# Patient Record
Sex: MALE | Race: BLACK OR AFRICAN AMERICAN | NOT HISPANIC OR LATINO | Employment: UNEMPLOYED | URBAN - METROPOLITAN AREA
[De-identification: names, ages, dates, MRNs, and addresses within clinical notes are randomized per-mention and may not be internally consistent; named-entity substitution may affect disease eponyms.]

---

## 2021-12-02 ENCOUNTER — OFFICE VISIT (OUTPATIENT)
Dept: FAMILY MEDICINE | Facility: CLINIC | Age: 61
End: 2021-12-02
Payer: MEDICARE

## 2021-12-02 VITALS
HEART RATE: 101 BPM | SYSTOLIC BLOOD PRESSURE: 180 MMHG | DIASTOLIC BLOOD PRESSURE: 120 MMHG | OXYGEN SATURATION: 99 % | WEIGHT: 244 LBS

## 2021-12-02 DIAGNOSIS — I10 ESSENTIAL HYPERTENSION: Primary | ICD-10-CM

## 2021-12-02 DIAGNOSIS — Z95.1 S/P CABG (CORONARY ARTERY BYPASS GRAFT): ICD-10-CM

## 2021-12-02 DIAGNOSIS — G62.9 NEUROPATHY: ICD-10-CM

## 2021-12-02 DIAGNOSIS — I48.20 CHRONIC ATRIAL FIBRILLATION (H): ICD-10-CM

## 2021-12-02 LAB
ALBUMIN SERPL-MCNC: 4 G/DL (ref 3.5–5)
ALP SERPL-CCNC: 108 U/L (ref 45–120)
ALT SERPL W P-5'-P-CCNC: 13 U/L (ref 0–45)
ANION GAP SERPL CALCULATED.3IONS-SCNC: 8 MMOL/L (ref 5–18)
AST SERPL W P-5'-P-CCNC: 15 U/L (ref 0–40)
BILIRUB SERPL-MCNC: 1.3 MG/DL (ref 0–1)
BUN SERPL-MCNC: 12 MG/DL (ref 8–22)
CALCIUM SERPL-MCNC: 9.1 MG/DL (ref 8.5–10.5)
CHLORIDE BLD-SCNC: 104 MMOL/L (ref 98–107)
CO2 SERPL-SCNC: 30 MMOL/L (ref 22–31)
CREAT SERPL-MCNC: 0.91 MG/DL (ref 0.7–1.3)
ERYTHROCYTE [DISTWIDTH] IN BLOOD BY AUTOMATED COUNT: 14.4 % (ref 10–15)
GFR SERPL CREATININE-BSD FRML MDRD: >90 ML/MIN/1.73M2
GLUCOSE BLD-MCNC: 94 MG/DL (ref 70–125)
HCT VFR BLD AUTO: 45.4 % (ref 40–53)
HGB BLD-MCNC: 14.6 G/DL (ref 13.3–17.7)
MCH RBC QN AUTO: 25.9 PG (ref 26.5–33)
MCHC RBC AUTO-ENTMCNC: 32.2 G/DL (ref 31.5–36.5)
MCV RBC AUTO: 81 FL (ref 78–100)
PLATELET # BLD AUTO: 193 10E3/UL (ref 150–450)
POTASSIUM BLD-SCNC: 4 MMOL/L (ref 3.5–5)
PROT SERPL-MCNC: 7.3 G/DL (ref 6–8)
RBC # BLD AUTO: 5.63 10E6/UL (ref 4.4–5.9)
SODIUM SERPL-SCNC: 142 MMOL/L (ref 136–145)
WBC # BLD AUTO: 4.7 10E3/UL (ref 4–11)

## 2021-12-02 PROCEDURE — 36415 COLL VENOUS BLD VENIPUNCTURE: CPT | Performed by: NURSE PRACTITIONER

## 2021-12-02 PROCEDURE — 85027 COMPLETE CBC AUTOMATED: CPT | Performed by: NURSE PRACTITIONER

## 2021-12-02 PROCEDURE — 99204 OFFICE O/P NEW MOD 45 MIN: CPT | Mod: 25 | Performed by: NURSE PRACTITIONER

## 2021-12-02 PROCEDURE — 93005 ELECTROCARDIOGRAM TRACING: CPT | Performed by: NURSE PRACTITIONER

## 2021-12-02 PROCEDURE — 93010 ELECTROCARDIOGRAM REPORT: CPT | Mod: OFF | Performed by: INTERNAL MEDICINE

## 2021-12-02 PROCEDURE — 80053 COMPREHEN METABOLIC PANEL: CPT | Performed by: NURSE PRACTITIONER

## 2021-12-02 RX ORDER — AMLODIPINE BESYLATE 10 MG/1
10 TABLET ORAL DAILY
Qty: 90 TABLET | Refills: 1 | Status: SHIPPED | OUTPATIENT
Start: 2021-12-02

## 2021-12-02 RX ORDER — LEVALBUTEROL TARTRATE 45 UG/1
1-2 AEROSOL, METERED ORAL
COMMUNITY
End: 2022-01-19

## 2021-12-02 RX ORDER — CARVEDILOL 25 MG/1
25 TABLET ORAL 2 TIMES DAILY WITH MEALS
Qty: 60 TABLET | Refills: 1 | Status: SHIPPED | OUTPATIENT
Start: 2021-12-02 | End: 2022-02-18

## 2021-12-02 RX ORDER — ATORVASTATIN CALCIUM 80 MG/1
80 TABLET, FILM COATED ORAL DAILY
Qty: 90 TABLET | Refills: 1 | Status: SHIPPED | OUTPATIENT
Start: 2021-12-02

## 2021-12-02 RX ORDER — GABAPENTIN 300 MG/1
300 CAPSULE ORAL 3 TIMES DAILY
Qty: 90 CAPSULE | Refills: 1 | Status: SHIPPED | OUTPATIENT
Start: 2021-12-02

## 2021-12-02 NOTE — PATIENT INSTRUCTIONS
I sent refills of your medication into the [facility] pharmacy in [place].  The address and contact information will be listed on today's paperwork.    We need to get you set up with a new doctor.  My nurse will help you do that today.    I placed a referral to our care coordination team.  Someone will be contacting you to help you with resources such as housing and health insurance.    We need to get a new baseline EKG and lab work today.    You need to establish care with a new cardiologist.  I placed a referral for you today.  Please call [phone].    It is extremely important that you answer your telephone and come to all of your appointments

## 2021-12-02 NOTE — PROGRESS NOTES
Assessment & Plan     Essential hypertension  Uncontrolled but has been off of his amlodipine and carvedilol for the better part of 1 year.  We need to have him restart these medications as soon as possible and come back for BP recheck    I would also like to get a new baseline CMP, CBC, BNP, and EKG    - amLODIPine (NORVASC) 10 MG tablet; Take 1 tablet (10 mg) by mouth daily  - Adult Cardiology Eval Referral; Future  - Comprehensive metabolic panel; Future  - CBC with platelets; Future  - Care Coordination Referral; Future    Neuropathy  Historically on gabapentin 300 mg 3 times daily for neuropathy.  Refills provided today.    - gabapentin (NEURONTIN) 300 MG capsule; Take 1 capsule (300 mg) by mouth 3 times daily  - Care Coordination Referral; Future    S/P CABG (coronary artery bypass graft)  He had some significant health issues over the last couple of years, unfortunately leading to homelessness.  He has been without his medications for the better part of a year.  Currently living with his daughter in [place].    He is not having any chest pain or shortness of breath right now.  I would like to get a new baseline EKG while we restart the atorvastatin and carvedilol.  Prescription sent into his pharmacy.    He needs to establish with a new cardiologist here in the [facility].  He plans on being in this area permanently.    - atorvastatin (LIPITOR) 80 MG tablet; Take 1 tablet (80 mg) by mouth daily  - carvedilol (COREG) 25 MG tablet; Take 1 tablet (25 mg) by mouth 2 times daily (with meals)  - Adult Cardiology Eval Referral; Future  - B-Type Natriuretic Peptide ([facility] Only); Future  - Care Coordination Referral; Future  - EKG 12-lead, tracing only    Chronic atrial fibrillation (H)  He has been without his Eliquis.  No recent strokes although he says that he did have a stroke years ago    - apixaban ANTICOAGULANT (ELIQUIS ANTICOAGULANT) 5 MG tablet; Take 1 tablet (5 mg) by mouth 2 times daily  - Adult  "Cardiology Eval Referral; Future  - B-Type Natriuretic Peptide ([facility] Only); Future  - Care Coordination Referral; Future  - EKG 12-lead, tracing only    Patient Instructions   I sent refills of your medication into the [facility] pharmacy in [place].  The address and contact information will be listed on today's paperwork.    We need to get you set up with a new doctor.  My nurse will help you do that today.    I placed a referral to our care coordination team.  Someone will be contacting you to help you with resources such as housing and health insurance.    We need to get a new baseline EKG and lab work today.    You need to establish care with a new cardiologist.  I placed a referral for you today.  Please call [phone].    It is extremely important that you answer your telephone and come to all of your appointments    Prescription drug management  19 minutes spent on the date of the encounter doing chart review, history and exam, documentation and further activities per the note     See Patient Instructions    Return in about 2 weeks (around 12/16/2021) for Follow up.    [facility]    Marleny Gill is a 61 year old who presents for the following health issues     HPI     Patient has a complicated cardiovascular history including atrial fibrillation, CVA, and CABG.  He was a late add-on to my schedule today for a 20-minute appointment and showed up late for his appointment.  We did not have time to go over his history in detail.    In short, patient is homeless, currently living with his daughter.  He has been without his medications for the better part of a year.    Feeling in good spirits today.  No major concerns today but does want refills.  Like to get established with a new PCP and cardiologist.    I was able to briefly review some records from his cardiology appointments in [place]:      \"Jairo Quevedo is a 59 year old male with cardiac " "history of CAD s/p CABG in 11/6/2018 [LIMA to LAD (extended with sort SVG segment due to short LIMA), SVG to PDA, SVG to Diagonal. OM too small to graft] , persistent atrial fibrillation and cardiac risk factors of CVA (03/24/2015), HTN who is here for follow up. He was also diagnosed with brachial plexitis Dec 2018 and is causing increased pain in his neck and left arm. He reports compliance with his medical therapy. Home BP is reported as 200/100 mmHg. He has no chest pain. He has chronic intermittent MEZA with intermittent bilateral LE edema. He has no loss of consciousness but has some dizziness. He has palpitations that he feels mostly at night.\"    Blood pressure is elevated today but he has not been on his medications.      Review of Systems   Constitutional, HEENT, cardiovascular, pulmonary, gi and gu systems are negative, except as otherwise noted.      Objective    BP (!) 180/120 (BP Location: Left arm, Patient Position: Sitting, Cuff Size: Adult Large)   Pulse 101   Wt 110.7 kg (244 lb)   SpO2 99%   There is no height or weight on file to calculate BMI.  Physical Exam     Patient is otherwise healthy appearing and in no acute distress      "

## 2021-12-03 ENCOUNTER — PATIENT OUTREACH (OUTPATIENT)
Dept: CARE COORDINATION | Facility: CLINIC | Age: 61
End: 2021-12-03
Payer: MEDICARE

## 2021-12-03 NOTE — PROGRESS NOTES
Clinic Care Coordination Contact  [facility]/Voicemail       Clinical Data: Care Coordinator Outreach  Outreach attempted x 1.  Left message on patient's voicemail with call back information and requested return call.  Plan: Care Coordinator will try to reach patient again in 1-2 business days.    Order Specific Question Answer Comment   Reason for Referral: Financial Support     Financial Support: Insurance      Order Specific Question Answer Comment   Reason for Referral: Financial Support     Financial Support: Insurance      Usha Espinosa  Community Health Worker  [facility] Care Coordination     Office: [phone]

## 2021-12-04 LAB
ATRIAL RATE - MUSE: 86 BPM
DIASTOLIC BLOOD PRESSURE - MUSE: NORMAL MMHG
INTERPRETATION ECG - MUSE: NORMAL
P AXIS - MUSE: NORMAL DEGREES
PR INTERVAL - MUSE: NORMAL MS
QRS DURATION - MUSE: 90 MS
QT - MUSE: 370 MS
QTC - MUSE: 455 MS
R AXIS - MUSE: 21 DEGREES
SYSTOLIC BLOOD PRESSURE - MUSE: NORMAL MMHG
T AXIS - MUSE: 68 DEGREES
VENTRICULAR RATE- MUSE: 91 BPM

## 2021-12-06 ENCOUNTER — PATIENT OUTREACH (OUTPATIENT)
Dept: CARE COORDINATION | Facility: CLINIC | Age: 61
End: 2021-12-06

## 2021-12-06 ENCOUNTER — PATIENT OUTREACH (OUTPATIENT)
Dept: NURSING | Facility: CLINIC | Age: 61
End: 2021-12-06
Payer: MEDICARE

## 2021-12-06 NOTE — PROGRESS NOTES
Clinic Care Coordination Contact  [facility]/Voicemail    Clinical Data: Care Coordinator Outreach  Outreach attempted x 2.  Left message on patient's voicemail with call back information and requested return call.  Plan: Care Coordinator sent care coordination introduction letter on 12/8/21 via mail. Care Coordinator will do no further outreaches at this time.    Usha Espinosa  Community Health Worker  [facility] Care Coordination   Office: [phone]

## 2021-12-06 NOTE — LETTER
[facility] CARE COORDINATION  [facility]    December 6, 2021    Jairo Quevedo  [address] [place] [address]    [place] [id number]      Dear Jairo,    I am a clinic community health worker who works with [facility]. I have been trying to reach you recently to introduce Clinic Care Coordination and to see if there was anything I could assist you with.  Below is a description of clinic care coordination and how I can further assist you.      The clinic care coordination team is made up of a registered nurse,  and community health worker who understand the health care system. The goal of clinic care coordination is to help you manage your health and improve access to the health care system in the most efficient manner. The team can assist you in meeting your health care goals by providing education, coordinating services, strengthening the communication among your providers and supporting you with any resource needs.    Please feel free to contact me at [phone] with any questions or concerns. We are focused on providing you with the highest-quality healthcare experience possible and that all starts with you.     Sincerely,     Usha Espinosa  Community Health Worker  [facility] Care Coordination   Office: [phone]

## 2021-12-06 NOTE — PROGRESS NOTES
Clinic Care Coordination Contact  Community Health Worker Initial Outreach    CHW Initial Information Gathering:  Referral Source: PCP  Preferred Hospital: [facility]  [phone]  Current living arrangement:: I live in a private home with family (Living with daughter-temporary)  Type of residence:: Town home  Community Resources: None  Supplies used at home:: None  Equipment Currently Used at Home: none  Informal Support system:: Children  No PCP office visit in Past Year: No (12/2/21 with Dr. Milan)  Transportation means:: Family,Public transportation  CHW Additional Questions  MyChart active?: No    Patient accepts CC: Yes. Patient scheduled for assessment with BETH Palumbo on 12/7/21 at 9:00. Patient noted desire to discuss assistance with looking into Housing options, medication resources, Health Insurance and transportation resources.     ** CHW gave patient medication resources for [employer] $4 program and [employer] Prescription Assistance program.

## 2021-12-07 ENCOUNTER — PATIENT OUTREACH (OUTPATIENT)
Dept: NURSING | Facility: CLINIC | Age: 61
End: 2021-12-07

## 2021-12-07 ENCOUNTER — PATIENT OUTREACH (OUTPATIENT)
Dept: NURSING | Facility: CLINIC | Age: 61
End: 2021-12-07
Payer: MEDICARE

## 2021-12-07 DIAGNOSIS — Z95.1 S/P CABG (CORONARY ARTERY BYPASS GRAFT): ICD-10-CM

## 2021-12-07 DIAGNOSIS — G62.9 NEUROPATHY: ICD-10-CM

## 2021-12-07 DIAGNOSIS — I48.20 CHRONIC ATRIAL FIBRILLATION (H): ICD-10-CM

## 2021-12-07 DIAGNOSIS — I10 ESSENTIAL HYPERTENSION: ICD-10-CM

## 2021-12-07 ASSESSMENT — ACTIVITIES OF DAILY LIVING (ADL): DEPENDENT_IADLS:: INDEPENDENT

## 2021-12-07 NOTE — PROGRESS NOTES
Clinic Care Coordination Contact    Community Health Worker Follow Up    Intervention and Education during outreach: Patient called CHW to confirm prescription resource given to patient by [facility]. CHW and patient discussed [place] Prescription Assistance program and gave patient that information again.     CHW Plan: CHW will consult with CCC SW to see if there were any other RX resources given or that we have for the patient and call him back if so. Next CHW outreach date is 1/7/21.    Usha Espinosa  [facility] Health Worker  [facility]  Clinic Care Coordination   Office: [phone]

## 2021-12-07 NOTE — PROGRESS NOTES
Clinic Care Coordination Contact    Clinic Care Coordination Contact  OUTREACH    Referral Information:  Referral Source: PCP    Primary Diagnosis: Cardiovascular - other    Chief Complaint   Patient presents with     Clinic Care Coordination - Initial        Universal Utilization:   Clinic Utilization  Difficulty keeping appointments:: No  Compliance Concerns: Yes  No-Show Concerns: Yes  No PCP office visit in Past Year: No (12/2/21 with Dr. Milan)  Utilization    Hospital Admissions  0             ED Visits  0             No Show Count (past year)  0                Current as of: 12/7/2021 12:29 PM              Clinical Concerns:  Current Medical Concerns:  A fib, hypertension,  S/P CABG (coronary artery bypass graft)    Current Behavioral Concerns: not discussed      Education Provided to patient:     *Provided overview/education on Medical Assistance and process for applying for Medical Assistance, SNAP, Medicare Savings and Cash assistance    *Mailed housing resources     [facility]  [phone]     [facility]  [phone]    [facility]  [phone]    [facility]  [phone]    [facility]  [phone]    *Provided [place] Prescription Assistance number and advised to call     Health Maintenance Reviewed:  (Overdue; Establishing with PCP 12/22)    Medication Management:  Medication review status: Not discussed due to nature of call    Functional Status:  Dependent ADLs:: Independent  Dependent IADLs:: Independent  Mobility Status: Independent  Fallen 2 or more times in the past year?: No  Any fall with injury in the past year?: No    Living Situation:  Current living arrangement:: I live in a private home with family (Living with daughter-temporary)  Type of residence:: [facility]    Lifestyle & Psychosocial Needs:    Social Determinants of Health     Tobacco Use: Low Risk      Smoking Tobacco Use: Never Smoker     Smokeless Tobacco Use: Never Used   Alcohol Use:  Not on file   Financial Resource Strain: Not on file   Food Insecurity: Not on file   Transportation Needs: Not on file   Physical Activity: Not on file   Stress: Not on file   Social Connections: Not on file   Intimate Partner Violence: Not on file   Depression: Not on file   Housing Stability: Not on file     Inadequate nutrition (GOAL):: No  Tube Feeding: No  Inadequate activity/exercise (GOAL):: No  Significant changes in sleep pattern (GOAL): No  Transportation means:: Family,Public transportation     Baptism or spiritual beliefs that impact treatment:: No  Mental health DX:: No  Mental health management concern (GOAL):: No  Chemical Dependency Status: No Current Concerns  Informal Support system:: Children      Resources and Interventions:  Current Resources:      Community Resources: None  Supplies used at home:: None  Equipment Currently Used at Home: none  Employment Status: disabled ([employer] $640/month)     Advance Care Plan/Directive  Advanced Care Plans/Directives on file:: No  Type Advanced Care Plans/Directives: Other  Advanced Care Plan/Directive Status: Declined Further Information    Referrals Placed: [facility] Resources,Transportation,Community Resources,Prescription Assistance Programs     Goals:   Goals        General     1- Medication (pt-stated)      Notes - Note created  12/7/2021  1:32 PM by Deepali Daniels LSW     Goal Statement: I will fill my prescriptions    Date Goal set: 12/7/21  Barriers: not taking medications  Strengths: willing to seek assistance   Date to Achieve By: 1 week  Patient expressed understanding of goal: yes    Action steps to achieve this goal:  1. I will contact [place] Prescription Assistance and provide necessary documentation to qualify for the program  2. I will attempt to fill my prescriptions at [facility] for those on their low cost list         2-Financial Wellbeing (pt-stated)      Notes - Note created  12/7/2021  1:28 PM by Deepali Daniels LSW     Goal Statement:  I will apply for health insurance, SNAP, Medicare Savings and Cash Assistance within the next 1-2 weeks if I am eligible.     Date Goal Set:  12/7/21  Strengths:  willing to seek assistance  Barriers: no insurance, low income  Date to Achieve By: 1-2 months  Patient expressed understanding of goal: yes    Action steps to achieve this goal:  1. I understand a referral was placed to the Financial Resource Worker, I will receive a call within the next 3 business days.    2. I understand the financial worker will make two attempts to call me. If I still need help with this goal, I will connect with my Community Health Worker Usha at [phone].              3- Functional (pt-stated)      Notes - Note created  12/7/2021  1:27 PM by Deepali Daniels LSW     Goal Statement: I will be approved for [employer] transportation    Date Goal set: 12/7/21  Barriers: no PCP  Strengths: willingess  Date to Achieve By: 2 alireza  Patient expressed understanding of goal: yes    Action steps to achieve this goal:  1. I will ask PCP on 12/22/21 to complete [employer] form  2. I will complete and mail my portion of the application         4- Psychosocial (pt-stated)      Notes - Note created  12/7/2021  1:34 PM by Deepali Daniels LSW     Goal Statement: I will get on wait lists for subsidized housing    Date Goal set: 12/7/21  Barriers: temporary housing  Strengths: daughter  Date to Achieve By: 6 alireza  Patient expressed understanding of goal: yes    Action steps to achieve this goal:  1. I will contact subsidized housing apartments and provide information needed to get on wait lists                   Financial Resource Worker Screening    County Benefits  Is patient requesting help applying for county benefits?: Yes  Have you recently applied for any county benefits?: No  How many people in your household?: 2  Do you buy/eat food together?: No  What is the monthly gross income for the household (wages, social security,  workers comp, and pension)? : 640    Insurance:  Was [place] for active insurance?: No  Is this an insurance renewal?: No  Is this a new insurance application request?: Yes  Have you recently applied for insurance?: No  How many people in your household? : 2  Do you file taxes?: No    Any other information for the FRW?: This gentleman recently moved from [place] and staying with daughter. Please assist with applications for [place], Medicare Savings Program, SNAP and cash assistance (if he qualifies) Hx stroke/bypass so processes information slowly (ex: writing down numbers etc)    Patient/Caregiver understanding: Pt agreeable to follow up from FRW and CC team    Outreach Frequency: monthly  Future Appointments              In 2 weeks Piedad Wade CNP [facility] WBWW          Plan: CHW outreach monthly.  CC SW available as needed and will monitor chart every 45 days.

## 2021-12-07 NOTE — LETTER
[facility] CARE COORDINATION  [facility]  [address] [place] [id number]    December 7, 2021    Jairo Quevedo  [address] [place] [address] [address] [place]  [place] [address]      Dear Jairo,    I am a clinic care coordinator who works with [facility]. I wanted to thank you for spending the time to talk with me.  Below are some housing resources.    [facility]  [phone]     [facility]  [phone]    [facility]  [phone]    [facility]  [phone]    [facility]  [phone]      Please feel free to contact the Community Health Worker at [phone] with any questions or concerns. We are focused on providing you with the highest-quality healthcare experience possible and that all starts with you.     Sincerely,     Care Coordination Team    Enclosed: I have enclosed a copy of the Patient Centered Plan of Care. This has helpful information and goals that we have talked about. Please keep this in an easy to access place to use as needed.

## 2021-12-07 NOTE — LETTER
[facility]  Patient Centered Plan of Care  About Me:        Patient Name:  Jairo Quevedo    YOB: 1960  Age:         61 year old   [facility] MRN:    [id number] Telephone Information:  Home Phone [phone]   Mobile [phone]       Address:  [address] [place]  [place] [address] Email address:  [email or web address]      Emergency Contact(s)    Name Relationship Lgl Grd Work Phone Home Phone Mobile Phone   SOPHIA EDWARDS Daughter   [phone]              Health Maintenance  Health Maintenance Reviewed: No data recorded    My Access Plan  Medical Emergency 911   Primary Clinic Line   - [phone]   24 Hour Appointment Line [phone] or  [phone]) (toll-free)   24 Hour Nurse Line [phone] (toll-free)   Preferred Urgent Care [facility] [phone]     [facility]  [phone]     Preferred Pharmacy [id number] - [place], [place] - [address] Behavioral Health Crisis Line The National Suicide Prevention Lifeline at 1-962.925.8076 or 913             My Care Team Members  Patient Care Team       Relationship Specialty Notifications Start End    No Ref-Primary, Physician PCP - General   12/2/21 12/22/21    Fax: [phone]         Deepali Daniels LSW Lead Care Coordinator  Admissions 12/7/21     Usha Espinosa Community Health Worker  Admissions 12/7/21     Rina Byers RN Clinic Care Coordinator Primary Care - CC Admissions 12/7/21     Phone: [phone]                 My Care Plans  Self Management and Treatment Plan  Goals and (Comments)  Goals        General     1- Medication (pt-stated)      Notes - Note created  12/7/2021  1:32 PM by Deepali Daniels LSW     Goal Statement: I will fill my prescriptions    Date Goal set: 12/7/21  Barriers: not taking medications  Strengths: willing to seek assistance   Date to Achieve By: 1 week  Patient expressed  understanding of goal: yes    Action steps to achieve this goal:  1. I will contact [place] Prescription Assistance and provide necessary documentation to qualify for the program  2. I will attempt to fill my prescriptions at [facility] for those on their low cost list         2-Financial Wellbeing (pt-stated)      Notes - Note created  12/7/2021  1:28 PM by Deepali Daniels LSW     Goal Statement: I will apply for health insurance, SNAP, Medicare Savings and Cash Assistance within the next 1-2 weeks if I am eligible.     Date Goal Set:  12/7/21  Strengths:  willing to seek assistance  Barriers: no insurance, low income  Date to Achieve By: 1-2 months  Patient expressed understanding of goal: yes    Action steps to achieve this goal:  1. I understand a referral was placed to the Financial Resource Worker, I will receive a call within the next 3 business days.    2. I understand the financial worker will make two attempts to call me. If I still need help with this goal, I will connect with my Community Health Worker Usha at [phone].              3- Medical (pt-stated)      Notes - Note created  12/7/2021  2:13 PM by Deepali Daniels LSW     Goal Statement: I will establish care with cardiology once I have health insurance     Date Goal set: 12/7/21  Barriers: no cardiologist  Strengths: order placed  Date to Achieve By: 3 months  Patient expressed understanding of goal: yes  Action steps to achieve this goal:  1. I will schedule and attend appointment  2. I will attend follow up appointment          4- Functional (pt-stated)      Notes - Note created  12/7/2021  1:27 PM by Deepali Daniels LSW     Goal Statement: I will be approved for [employer] transportation    Date Goal set: 12/7/21  Barriers: no PCP  Strengths: willingess  Date to Achieve By: 2 [facility]  Patient expressed understanding of goal: yes    Action steps to achieve this goal:  1. I will ask PCP on 12/22/21 to complete [employer] form  2. I  will complete and mail my portion of the application         5- Psychosocial (pt-stated)      Notes - Note created  12/7/2021  1:34 PM by Deepali Daniels LSW     Goal Statement: I will get on wait lists for subsidized housing    Date Goal set: 12/7/21  Barriers: temporary housing  Strengths: daughter  Date to Achieve By: 6 [facility]  Patient expressed understanding of goal: yes    Action steps to achieve this goal:  1. I will contact subsidized housing apartments and provide information needed to get on wait lists                     Advance Care Plans/Directives Type:   No data recorded    My Medical and Care Information  Problem List   There is no problem list on file for this patient.     Current Medications and Allergies:  See printed Medication Report.    Care Coordination Start Date: 12/7/2021   Frequency of Care Coordination: monthly     Form Last Updated: 12/07/2021

## 2021-12-08 ENCOUNTER — PATIENT OUTREACH (OUTPATIENT)
Dept: CARE COORDINATION | Facility: CLINIC | Age: 61
End: 2021-12-08
Payer: MEDICARE

## 2021-12-08 NOTE — PROGRESS NOTES
Clinic Care Coordination Contact  Financial Resource Worker Follow Up    Goals:   Goals Addressed as of 12/17/2021 at 10:20 AM                    12/8/21       2-Financial Wellbeing (pt-stated)   50%    Added 12/7/21 by Deepali Daniels LSW      Goal Statement: I will apply for health insurance, SNAP, Medicare Savings and Cash Assistance within the next 1-2 weeks if I am eligible.     Date Goal Set:  12/7/21  Strengths:  willing to seek assistance  Barriers: no insurance, low income  Date to Achieve By: 1-2 months  Patient expressed understanding of goal: yes    Action steps to achieve this goal:  1. I understand a referral was placed to the Financial Resource Worker, I will receive a call within the next 3 business days.    2. I understand the financial worker will make two attempts to call me. If I still need help with this goal, I will connect with my Community Health Worker Usha at [phone].    3. I will provide any necessary documents to the county if needed.                  Intervention and Education during outreach: N/A     FRW Plan: follow up in 3 to 4 weeks

## 2021-12-13 ENCOUNTER — PATIENT OUTREACH (OUTPATIENT)
Dept: NURSING | Facility: CLINIC | Age: 61
End: 2021-12-13
Payer: MEDICARE

## 2021-12-13 NOTE — PROGRESS NOTES
Clinic Care Coordination Contact    Community Health Worker Follow Up    Care Gaps:     Health Maintenance Due   Topic Date Due     ADVANCE CARE PLANNING  Never done     COLORECTAL CANCER SCREENING  Never done     COVID-19 Vaccine (1) Never done     HIV SCREENING  Never done     MEDICARE ANNUAL WELLNESS VISIT  Never done     HEPATITIS C SCREENING  Never done     DTAP/TDAP/TD IMMUNIZATION (1 - Tdap) Never done     LIPID  Never done     ZOSTER IMMUNIZATION (1 of 2) Never done     PHQ-2  Never done     INFLUENZA VACCINE (1) Never done       Postponed to next CHW outreach date     Goals:       Intervention and Education during outreach: patient called to return FRW call. CHW messaged FRW [place]. FRW will call patient back today 12/13/21 after the lunch hour.     CHW Plan: CHW will follow up with patient 1/7/22.

## 2021-12-17 ENCOUNTER — APPOINTMENT (OUTPATIENT)
Dept: CARE COORDINATION | Facility: CLINIC | Age: 61
End: 2021-12-17
Payer: MEDICARE

## 2021-12-22 ENCOUNTER — OFFICE VISIT (OUTPATIENT)
Dept: INTERNAL MEDICINE | Facility: CLINIC | Age: 61
End: 2021-12-22
Payer: MEDICARE

## 2021-12-22 VITALS
HEIGHT: 72 IN | DIASTOLIC BLOOD PRESSURE: 82 MMHG | SYSTOLIC BLOOD PRESSURE: 148 MMHG | HEART RATE: 88 BPM | WEIGHT: 242 LBS | BODY MASS INDEX: 32.78 KG/M2

## 2021-12-22 DIAGNOSIS — E66.811 CLASS 1 OBESITY DUE TO EXCESS CALORIES WITH SERIOUS COMORBIDITY AND BODY MASS INDEX (BMI) OF 32.0 TO 32.9 IN ADULT: ICD-10-CM

## 2021-12-22 DIAGNOSIS — R10.31 RLQ ABDOMINAL PAIN: ICD-10-CM

## 2021-12-22 DIAGNOSIS — Z76.89 ENCOUNTER TO ESTABLISH CARE: ICD-10-CM

## 2021-12-22 DIAGNOSIS — E66.09 CLASS 1 OBESITY DUE TO EXCESS CALORIES WITH SERIOUS COMORBIDITY AND BODY MASS INDEX (BMI) OF 32.0 TO 32.9 IN ADULT: ICD-10-CM

## 2021-12-22 DIAGNOSIS — G62.9 NEUROPATHY: ICD-10-CM

## 2021-12-22 DIAGNOSIS — I10 BENIGN ESSENTIAL HYPERTENSION: ICD-10-CM

## 2021-12-22 DIAGNOSIS — I48.20 CHRONIC ATRIAL FIBRILLATION (H): ICD-10-CM

## 2021-12-22 DIAGNOSIS — R10.9 RIGHT FLANK PAIN: ICD-10-CM

## 2021-12-22 DIAGNOSIS — M54.9 UPPER BACK PAIN ON RIGHT SIDE: ICD-10-CM

## 2021-12-22 DIAGNOSIS — E78.5 HYPERLIPIDEMIA LDL GOAL <130: ICD-10-CM

## 2021-12-22 LAB
ALBUMIN SERPL-MCNC: 3.8 G/DL (ref 3.5–5)
ALBUMIN UR-MCNC: 30 MG/DL
ALP SERPL-CCNC: 126 U/L (ref 45–120)
ALT SERPL W P-5'-P-CCNC: 12 U/L (ref 0–45)
ANION GAP SERPL CALCULATED.3IONS-SCNC: 9 MMOL/L (ref 5–18)
APPEARANCE UR: CLEAR
AST SERPL W P-5'-P-CCNC: 11 U/L (ref 0–40)
BACTERIA #/AREA URNS HPF: ABNORMAL /HPF
BASOPHILS # BLD AUTO: 0 10E3/UL (ref 0–0.2)
BASOPHILS NFR BLD AUTO: 0 %
BILIRUB SERPL-MCNC: 1.2 MG/DL (ref 0–1)
BILIRUB UR QL STRIP: ABNORMAL
BUN SERPL-MCNC: 14 MG/DL (ref 8–22)
CALCIUM SERPL-MCNC: 9.2 MG/DL (ref 8.5–10.5)
CHLORIDE BLD-SCNC: 108 MMOL/L (ref 98–107)
CO2 SERPL-SCNC: 26 MMOL/L (ref 22–31)
COLOR UR AUTO: YELLOW
CREAT SERPL-MCNC: 0.85 MG/DL (ref 0.7–1.3)
EOSINOPHIL # BLD AUTO: 0.1 10E3/UL (ref 0–0.7)
EOSINOPHIL NFR BLD AUTO: 2 %
ERYTHROCYTE [DISTWIDTH] IN BLOOD BY AUTOMATED COUNT: 14.2 % (ref 10–15)
GFR SERPL CREATININE-BSD FRML MDRD: >90 ML/MIN/1.73M2
GLUCOSE BLD-MCNC: 89 MG/DL (ref 70–125)
GLUCOSE UR STRIP-MCNC: NEGATIVE MG/DL
HCT VFR BLD AUTO: 42.7 % (ref 40–53)
HGB BLD-MCNC: 13.8 G/DL (ref 13.3–17.7)
HGB UR QL STRIP: ABNORMAL
IMM GRANULOCYTES # BLD: 0 10E3/UL
IMM GRANULOCYTES NFR BLD: 0 %
KETONES UR STRIP-MCNC: ABNORMAL MG/DL
LEUKOCYTE ESTERASE UR QL STRIP: NEGATIVE
LYMPHOCYTES # BLD AUTO: 1.5 10E3/UL (ref 0.8–5.3)
LYMPHOCYTES NFR BLD AUTO: 31 %
MCH RBC QN AUTO: 26.3 PG (ref 26.5–33)
MCHC RBC AUTO-ENTMCNC: 32.3 G/DL (ref 31.5–36.5)
MCV RBC AUTO: 81 FL (ref 78–100)
MONOCYTES # BLD AUTO: 0.3 10E3/UL (ref 0–1.3)
MONOCYTES NFR BLD AUTO: 7 %
MUCOUS THREADS #/AREA URNS LPF: PRESENT /LPF
NEUTROPHILS # BLD AUTO: 2.9 10E3/UL (ref 1.6–8.3)
NEUTROPHILS NFR BLD AUTO: 61 %
NITRATE UR QL: NEGATIVE
PH UR STRIP: 5.5 [PH] (ref 5–8)
PLATELET # BLD AUTO: 196 10E3/UL (ref 150–450)
POTASSIUM BLD-SCNC: 4.2 MMOL/L (ref 3.5–5)
PROT SERPL-MCNC: 7.2 G/DL (ref 6–8)
RBC # BLD AUTO: 5.25 10E6/UL (ref 4.4–5.9)
RBC #/AREA URNS AUTO: ABNORMAL /HPF
SODIUM SERPL-SCNC: 143 MMOL/L (ref 136–145)
SP GR UR STRIP: >=1.03 (ref 1–1.03)
SQUAMOUS #/AREA URNS AUTO: ABNORMAL /LPF
UROBILINOGEN UR STRIP-ACNC: 1 E.U./DL
WBC # BLD AUTO: 4.8 10E3/UL (ref 4–11)
WBC #/AREA URNS AUTO: ABNORMAL /HPF

## 2021-12-22 PROCEDURE — 85025 COMPLETE CBC W/AUTO DIFF WBC: CPT | Performed by: NURSE PRACTITIONER

## 2021-12-22 PROCEDURE — 80053 COMPREHEN METABOLIC PANEL: CPT | Performed by: NURSE PRACTITIONER

## 2021-12-22 PROCEDURE — 36415 COLL VENOUS BLD VENIPUNCTURE: CPT | Performed by: NURSE PRACTITIONER

## 2021-12-22 PROCEDURE — 99214 OFFICE O/P EST MOD 30 MIN: CPT | Performed by: NURSE PRACTITIONER

## 2021-12-22 PROCEDURE — 81001 URINALYSIS AUTO W/SCOPE: CPT | Performed by: NURSE PRACTITIONER

## 2021-12-22 RX ORDER — METHOCARBAMOL 500 MG/1
500 TABLET, FILM COATED ORAL 3 TIMES DAILY
Qty: 21 TABLET | Refills: 0 | Status: SHIPPED | OUTPATIENT
Start: 2021-12-22 | End: 2021-12-28

## 2021-12-22 RX ORDER — TRAMADOL HYDROCHLORIDE 50 MG/1
50 TABLET ORAL EVERY 6 HOURS PRN
Qty: 10 TABLET | Refills: 0 | Status: SHIPPED | OUTPATIENT
Start: 2021-12-22 | End: 2021-12-25

## 2021-12-22 RX ORDER — WARFARIN SODIUM 5 MG/1
5 TABLET ORAL DAILY
Qty: 3 TABLET | Refills: 0 | Status: SHIPPED | OUTPATIENT
Start: 2021-12-22 | End: 2021-12-23

## 2021-12-22 ASSESSMENT — MIFFLIN-ST. JEOR: SCORE: 1945.7
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
## 2021-12-22 NOTE — PATIENT INSTRUCTIONS
Rest, push fluids.    Your urine and white count were normal.    Try Robaxin as needed to help loosen your muscles up.     Start the warfarin on Monday.     Call [phone] to set up your CT scan.     Call [phone] to set up your Cardiology referral.    ER over the rest of the week if having worsening pain, nausea, vomiting, fever, or chills.     Patient Education     Using Blood Thinners (Anticoagulants)  Blood thinners are medicines that help prevent blood clots from forming. They are also called anticoagulants. The medicines include:     Warfarin    Heparin    Dabigatran    Rivaroxaban    Apixaban    Edoxaban    Betrixaban  Your healthcare provider will talk with you about which medicine is best for you.     Before you start taking a blood thinner  Before starting your medicine, tell your healthcare provider if you have any of these:     Stomach ulcer, now or in the past    Vomiting of blood    Blood in your stool (black or red color)    Aneurysm    Pericarditis    Pericardial effusion    Blood disorder    Recent surgery    Stroke or ministroke (called a TIA)    Spinal puncture    Kidney or liver disease    Uncontrolled high blood pressure    Diabetes    Vasculitis  Also tell your healthcare provider if any of these apply to you:     You are younger than 18 years old.    You had a recent or have a planned dental procedure.    You are pregnant or breastfeeding.  This list may not include all conditions that can affect how your medicine works. Talk with your healthcare provider and pharmacist.   Medicines that interact with blood thinners  Many medicines change how blood thinners work. Before taking a blood thinner, tell your healthcare provider about all medicines and supplements you take. This includes prescription and over-the-counter medicines, vitamins, or herbs. Tell your healthcare provider if you take:     Antibiotic medicine    Heart medicine    Cimetidine    Anti-inflammatory medicine such as  aspirin, ibuprofen, naproxen, ketoprofen, or arthritis medicine    Medicine for depression, cancer, HIV (protease inhibitors), diabetes, seizures, gout, high cholesterol, or thyroid    Multi-vitamins that have vitamin K    Herbs such as ginkgo, Co-Q10, garlic, or John's wort  This list may not include all medicines and supplements that can affect how your medicine works. Talk with your healthcare provider and pharmacist.   Taking a blood thinner safely  When you are taking a blood thinner, you will need to take care to stay safe. Too much blood thinner puts you at risk for bleeding. Too little puts you at risk for stroke. Follow these guidelines. Also follow any others that your healthcare provider gives you.     Have regular blood tests as advised. Warfarin requires regular testing at least once a month. The other medicines do not.    Tell your healthcare provider about all medicines you take. This includes over-the-counter medicines, vitamins, or herbal remedies. Don't take any medicines that your healthcare provider doesn t know about. This includes ones you buy over-the-counter. Some medicines can affect how blood thinners work. This can cause serious problems.    Tell all of your healthcare providers that you take a blood thinner. This includes your dentist, chiropractor, home health nurse, and other doctors.    Carry a medical ID card or wear a medical-alert bracelet that says you take a blood thinner.    Before taking aspirin, check with your healthcare provider. Aspirin can greatly increase your risk of bleeding.  Blood thinners make bleeding harder to stop. Even a small injury could cause a lot of bleeding. An injury can cause bleeding inside your body that you don t know about. To protect yourself:     Don't do any activities that may cause injury.    Use a soft-bristle toothbrush and waxed dental floss.    Shave with an electric razor rather than a blade.    Don t go barefoot.    Don t trim corns or  calluses yourself.  Important safety information  Be extra careful when you are taking a blood thinner. Always:     Take it exactly as prescribed. Follow your healthcare provider's instructions. Blood thinners can be dangerous if not taken correctly. They make your blood less likely to form clots. If you take too much, it can cause serious internal or external bleeding.    Take it at the same time each day. Take it with a full glass of water, with or without food. If you miss a dose, call your healthcare provider to find out how much to take. Don't take a double dose.    Have regular blood tests. You may need to have regular tests while you are taking blood thinners. These may include blood tests to check your international normalized ratio (INR) and prothrombin time (PT). These tests show how fast your blood clots. You will also have a complete blood count (CBC) once in a while. This looks at your blood and platelet levels. Both of these need to be watched while you're on warfarin. Ask your healthcare provider if you need to visit the clinic every week. You can also find out if your blood can be tested at home. Ask your provider how often you will need your blood tested for the blood thinner you are taking.    Tell your healthcare provider about changes in your medicines.  Some medicines can affect your INR and PT levels. This includes any over-the-counter medicines, supplements like vitamin K, or herbal remedies.    Keep your diet the same. What you eat can also affect your INR and PT levels. So eat a consistent diet. It's most important to eat the same amount of foods that are high in vitamin K. Talk with your healthcare provider before making any changes in your diet.    Be careful not to injure yourself. Remember that warfarin increases your risk of bleeding. If you have a severe injury and are concerned about internal or external bleeding, call your provider.  Blood thinners: Getting regular blood tests   You  will have 2 tests to find out how your blood is clotting. One is called prothrombin time (PT). The other is called the international normalized ratio (INR).     Get your blood tests as often as directed.    Follow up with your healthcare provider as advised. It may take a few hours for him or her to get your results. Call to find out your lab results. Ask your healthcare provider if you need to make changes to your dose or diet.    If your blood tests are not done at your healthcare provider s office, tell him or her as soon as you get your test results.  My next INR/PT blood test is on _____________ (date) at ___________ (time) by ___________ (name of doctor or clinic).   The name of the healthcare provider who is in charge of my anticoagulation therapy is _____________________. The phone number is _________________.   Watching what you eat  Vitamin K is a nutrient found in some foods. Vitamin K helps your blood clot. Because of this, you may need to watch how much you eat of foods that have vitamin K. These foods can affect the way your blood thinner works.     Try to keep your diet about the same each day. If you change your diet for any reason, such as for illness or to lose weight, tell your healthcare provider.    Each day, eat the same amount of foods that are high in vitamin K. These include asparagus, avocado, broccoli, cabbage, kale, spinach, and some other leafy green vegetables.    Limit oils that are high in vitamin K to 2 to 4 tablespoons a day. This includes oils such as soybean, canola, and olive    Ask your healthcare provider if you should not drink alcohol while you are taking a blood thinner.    Don't drink teas that contain sweet clover, sweet iain, or tonka beans.    Talk with your healthcare provider and pharmacist about foods or ingredients that can affect blood thinner levels. These include grapefruit juice, cranberries and cranberry juice, fish oil, garlic, abad, licorice, turmeric,  and herbal teas and supplements.  This list may not include all foods and ingredients that can affect how your medicine works. Talk with your healthcare provider and pharmacist.   When to call your healthcare provider  Call your healthcare provider right away if you have any of these:     Bleeding that doesn t stop in 10 minutes    A heavy menstrual period or bleeding between periods    Coughing up or vomiting blood    Bloody diarrhea    Bleeding hemorrhoids     Dark-colored urine    Black stools    Red or black-and-blue marks on the skin that get larger    Dizziness    Chest pain    Trouble breathing    Rash    Itching    Swelling    Trouble swallowing  [employer] last reviewed this educational content on 1/1/2020 2000-2021 [employer]. All rights reserved. This information is not intended as a substitute for professional medical care. Always follow your healthcare professional's instructions.           Patient Education     Flank Pain with Uncertain Cause    The flank is the area between your upper belly (abdomen) and your back. Pain there is often caused by a problem with your kidneys. It might be a kidney infection or a kidney stone. Other causes of flank pain include spinal arthritis, a pinched nerve from a back injury, a rib injury, a bruise, a back muscle strain, inflammation, or spasm.  The cause of your flank pain is not certain. You may need other tests.  Home care  Follow these tips when caring for yourself at home:    You may use acetaminophen or ibuprofen to control pain, unless your healthcare provider prescribed another medicine. If you have chronic liver or kidney disease, talk with your provider before taking these medicines. Also talk with your provider first if you ve ever had a stomach ulcer or digestive bleeding.    If the pain is coming from your muscles, you may get relief with ice or heat. During the first 2 days after the injury, put an ice pack on the painful area for 20  minutes every 2 to 4 hours. This will reduce swelling and pain. A hot shower, hot bath, or heating pad works well for a muscle spasm. You can start with ice, then switch to heat after 2 days. You might find that alternating ice and heat works well. Use the method that feels the best to you.  Follow-up care  Follow up with your healthcare provider if your symptoms don t get better over the next few days.  When to seek medical advice  Call your healthcare provider right away if any of these happen:    Repeated vomiting    Fever of 100.4 F (38 C) or higher, or as directed by your healthcare provider    Flank pain that gets worse    Pain that spreads to the front of your belly (abdomen)    Dizziness, weakness, or fainting    Blood in your urine    Burning feeling when you urinate or the need to urinate often    Pain in one of your legs that gets worse    Numbness or weakness in a leg  [employer] last reviewed this educational content on 10/1/2019    4296-3907 [employer]. All rights reserved. This information is not intended as a substitute for professional medical care. Always follow your healthcare professional's instructions.

## 2021-12-23 ENCOUNTER — TELEPHONE (OUTPATIENT)
Dept: INTERNAL MEDICINE | Facility: CLINIC | Age: 61
End: 2021-12-23
Payer: MEDICARE

## 2021-12-23 ENCOUNTER — TELEPHONE (OUTPATIENT)
Dept: ANTICOAGULATION | Facility: CLINIC | Age: 61
End: 2021-12-23
Payer: MEDICARE

## 2021-12-23 DIAGNOSIS — I48.20 CHRONIC ATRIAL FIBRILLATION (H): ICD-10-CM

## 2021-12-23 DIAGNOSIS — I48.20 CHRONIC ATRIAL FIBRILLATION (H): Primary | ICD-10-CM

## 2021-12-23 DIAGNOSIS — Z86.73 HISTORY OF STROKE: ICD-10-CM

## 2021-12-23 RX ORDER — WARFARIN SODIUM 5 MG/1
5 TABLET ORAL DAILY
Qty: 30 TABLET | Refills: 1 | Status: SHIPPED | OUTPATIENT
Start: 2021-12-23 | End: 2022-01-19

## 2021-12-23 NOTE — TELEPHONE ENCOUNTER
Please call him and tell him I transmitted prescription for warfarin 5 mg tablets, #30.  Tell him that the anticoagulation clinic nurses will be contacting him probably in the next couple days, and they will be ordering his INR blood tests and adjusting his dose of warfarin, which may involve new prescriptions for different size tablets.

## 2021-12-23 NOTE — TELEPHONE ENCOUNTER
Reason for Call:  Medication or medication refill:    Do you use a [facility]? No. Name of the pharmacy and phone number for the current request:  [facility] [address] [place] [address]  [phone]    Name of the medication requested: warfarin ANTICOAGULANT (COUMADIN) 5 MG tablet     Other request: Pt only got 3 tablets.... he is wondering why this is so? This is medication for his heart, please advise and send more...     Can we leave a detailed message on this number? YES    Phone number patient can be reached at: Home number on file [phone] (home)    Best Time: any    Call taken on 12/23/2021 at 1:52 PM by Nicholas Medina

## 2021-12-23 NOTE — TELEPHONE ENCOUNTER
Spoke with Jairo and introduced self and ACM program. He will be starting warfarin on Monday 12/27 as instructed at the office visit. He did get the warfarin 5 mg and understands to take 1 tablet daily in the evening. Discussed INR monitoring. INR appointment made for 12/30.  Discussed diet, alcohol and medication interactions. He has ACN number to call with any questions. New patient packet sent in mail.

## 2021-12-24 ENCOUNTER — HOSPITAL ENCOUNTER (OUTPATIENT)
Dept: CT IMAGING | Facility: CLINIC | Age: 61
Discharge: HOME | End: 2021-12-24
Attending: NURSE PRACTITIONER | Admitting: NURSE PRACTITIONER
Payer: MEDICARE

## 2021-12-24 DIAGNOSIS — R10.31 RLQ ABDOMINAL PAIN: ICD-10-CM

## 2021-12-24 DIAGNOSIS — R10.9 RIGHT FLANK PAIN: ICD-10-CM

## 2021-12-24 PROCEDURE — 74176 CT ABD & PELVIS W/O CONTRAST: CPT | Mod: MG

## 2021-12-28 DIAGNOSIS — R30.0 DYSURIA: Primary | ICD-10-CM

## 2021-12-28 DIAGNOSIS — M54.9 UPPER BACK PAIN ON RIGHT SIDE: ICD-10-CM

## 2021-12-28 DIAGNOSIS — R10.9 RIGHT FLANK PAIN: ICD-10-CM

## 2021-12-28 RX ORDER — METHOCARBAMOL 500 MG/1
500 TABLET, FILM COATED ORAL 3 TIMES DAILY PRN
Qty: 21 TABLET | Refills: 0 | Status: SHIPPED | OUTPATIENT
Start: 2021-12-28

## 2021-12-28 RX ORDER — TRAMADOL HYDROCHLORIDE 50 MG/1
50 TABLET ORAL EVERY 6 HOURS PRN
Qty: 10 TABLET | Refills: 0 | OUTPATIENT
Start: 2021-12-28 | End: 2021-12-31

## 2021-12-28 NOTE — TELEPHONE ENCOUNTER
Refill Request  Medication name: Pending Prescriptions:                       Disp   Refills    methocarbamol (ROBAXIN) 500 MG tablet     21 tab*0            Sig: Take 1 tablet (500 mg) by mouth 3 times daily    Who prescribed the medication: Mauro   Last refill on medication: 12/22/21  Requested Pharmacy: [facility]  Last appointment with PCP: 12/22/21  Next appointment: Appointment scheduled for 01/19/22

## 2021-12-28 NOTE — TELEPHONE ENCOUNTER
----- Message from Piedad Wade CNP sent at 12/27/2021  6:58 AM CST -----  Please call the patient update him of his labs as follows:    Your white count, red count, hemoglobin was normal.    Your electrolytes, blood sugar, kidney function was normal.  Your liver function was mildly elevated at 1.2, this is your bilirubin.  Your liver enzymes are normal.    His CT was normal per previous message.

## 2021-12-28 NOTE — TELEPHONE ENCOUNTER
(FYI)Relayed CNP message, however, patient is reporting that his right sided flank pain has not improved and he is experiencing increased frequency with going to the bathroom. I advised him to follow up in a month per note from 12/22/2021      Florentino WILKINSON,DAMI

## 2021-12-28 NOTE — TELEPHONE ENCOUNTER
Patient is very concerned regarding urinary frequency.  He is waking several times a night (7-8 times per night)  He said this has been going on for about 1 month.  Also states he is having a lot of pain in his kidney area - he states it feels like someone is pushing on his back in that area.  I gave him the result from Dr. Christine.  He has an appointment to follow up in January with Piedad.       No pain, burning with urination.

## 2021-12-28 NOTE — TELEPHONE ENCOUNTER
With his symptoms and unclear diagnosis, I would recommend scheduling appointment with Piedad this week or with available provider.

## 2021-12-28 NOTE — TELEPHONE ENCOUNTER
His CT scan did not show any kidney stone.  Unclear why he needs tramadol but he will need to discuss with Piedad.  I will refill his methocarbamol

## 2021-12-29 RX ORDER — TRAMADOL HYDROCHLORIDE 50 MG/1
50 TABLET ORAL EVERY 6 HOURS PRN
Qty: 10 TABLET | Refills: 0 | Status: SHIPPED | OUTPATIENT
Start: 2021-12-29 | End: 2022-01-01

## 2021-12-29 RX ORDER — TAMSULOSIN HYDROCHLORIDE 0.4 MG/1
0.4 CAPSULE ORAL EVERY EVENING
Qty: 30 CAPSULE | Refills: 1 | Status: SHIPPED | OUTPATIENT
Start: 2021-12-29

## 2021-12-29 NOTE — TELEPHONE ENCOUNTER
His CT showed no concerning findings. Urine showed likely contamination. I think his pain is musculoskeletal in nature.    Let's try Flomax to see if this helps with the urine issues, I have sent this into pharmacy.    For the pain I have refilled an additional Tramadol prescription, he should be using this sparingly, alternating Tylenol and Ibuprofen, heat as well. We will have to watch his INR closely.    I would like to see him back next week for follow up.

## 2021-12-29 NOTE — TELEPHONE ENCOUNTER
Left the patient a voicemail to call back for the below detailed clinician documentation. Please relay these messages and assist the patient with scheduling an appointment for next week. Thank you.

## 2021-12-30 ENCOUNTER — ANTICOAGULATION THERAPY VISIT (OUTPATIENT)
Dept: ANTICOAGULATION | Facility: CLINIC | Age: 61
End: 2021-12-30

## 2021-12-30 ENCOUNTER — LAB (OUTPATIENT)
Dept: LAB | Facility: CLINIC | Age: 61
End: 2021-12-30
Payer: MEDICARE

## 2021-12-30 DIAGNOSIS — I48.20 CHRONIC ATRIAL FIBRILLATION (H): ICD-10-CM

## 2021-12-30 DIAGNOSIS — Z86.73 HISTORY OF STROKE: ICD-10-CM

## 2021-12-30 DIAGNOSIS — Z95.1 S/P CABG (CORONARY ARTERY BYPASS GRAFT): ICD-10-CM

## 2021-12-30 DIAGNOSIS — I48.20 CHRONIC ATRIAL FIBRILLATION (H): Primary | ICD-10-CM

## 2021-12-30 LAB
BNP SERPL-MCNC: 159 PG/ML (ref 0–52)
INR BLD: 1.2 (ref 0.9–1.1)

## 2021-12-30 PROCEDURE — 85610 PROTHROMBIN TIME: CPT

## 2021-12-30 PROCEDURE — 83880 ASSAY OF NATRIURETIC PEPTIDE: CPT

## 2021-12-30 PROCEDURE — 36415 COLL VENOUS BLD VENIPUNCTURE: CPT

## 2021-12-30 NOTE — PROGRESS NOTES
ANTICOAGULATION MANAGEMENT     Jairo JADE [place] 61 year old male is on warfarin with subtherapeutic INR result. (Goal INR 2.0-3.0)    Recent labs: (last 7 days)     12/30/21  1039   INR 1.2*       ASSESSMENT     Source(s): Chart Review, Patient/Caregiver Call and Template       Warfarin doses taken: Warfarin taken as instructed    Diet: No new diet changes identified    New illness, injury, or hospitalization: No    Medication/supplement changes: None noted    Signs or symptoms of bleeding or clotting: No    Previous INR: n/a    Additional findings: New start on day 4 of warfarin     PLAN     Recommended plan for no diet, medication or health factor changes affecting INR     Dosing Instructions:  Increase your warfarin dose (28.6% change) with next INR in 4 days       Summary  As of 12/30/2021    Full warfarin instructions:  5 mg every Sun, Tue, Fri; 7.5 mg all other days   Next INR check:  1/3/2022             Telephone call with Jairo who verbalizes understanding and agrees to plan and who agrees to plan and repeated back plan correctly    Lab visit scheduled    Education provided: Goal range and significance of current result, Importance of therapeutic range, Importance of following up at instructed interval and Importance of taking warfarin as instructed    Plan made per [facility] anticoagulation protocol    Roseline Weiss RN  Anticoagulation Clinic  12/30/2021    _______________________________________________________________________     Anticoagulation Episode Summary     Current INR goal:  2.0-3.0   TTR:  --   Target end date:  Indefinite   Send INR reminders to:  CESARIO NEWSOME    Indications    Chronic atrial fibrillation (H) [I48.20]           Comments:           Anticoagulation Care Providers     Provider Role Specialty Phone number    Piedad Wade CNP Referring Nurse Practitioner - Gerontology [phone]    Jimbo Renteria MD Referring Internal Medicine [phone]

## 2022-01-03 ENCOUNTER — ANTICOAGULATION THERAPY VISIT (OUTPATIENT)
Dept: INTERNAL MEDICINE | Facility: CLINIC | Age: 62
End: 2022-01-03

## 2022-01-03 ENCOUNTER — LAB (OUTPATIENT)
Dept: LAB | Facility: CLINIC | Age: 62
End: 2022-01-03
Payer: MEDICARE

## 2022-01-03 DIAGNOSIS — I48.20 CHRONIC ATRIAL FIBRILLATION (H): Primary | ICD-10-CM

## 2022-01-03 DIAGNOSIS — I48.20 CHRONIC ATRIAL FIBRILLATION (H): ICD-10-CM

## 2022-01-03 DIAGNOSIS — Z86.73 HISTORY OF STROKE: ICD-10-CM

## 2022-01-03 LAB — INR PPP: 1.94 (ref 0.85–1.15)

## 2022-01-03 PROCEDURE — 85610 PROTHROMBIN TIME: CPT

## 2022-01-03 PROCEDURE — 36415 COLL VENOUS BLD VENIPUNCTURE: CPT

## 2022-01-03 NOTE — PROGRESS NOTES
ANTICOAGULATION MANAGEMENT     Jairo [place] 61 year old male is on warfarin with subtherapeutic INR result. (Goal INR 2.0-3.0)    Recent labs: (last 7 days)     01/03/22  0949   INR 1.94*       ASSESSMENT     Source(s): Chart Review and Patient/Caregiver Call       Warfarin doses taken: Warfarin taken as instructed    Diet: No new diet changes identified    New illness, injury, or hospitalization: patient experiencing flak pain and RLQ pain--see CT; MD's stated they suspect cancer per patient.     Medication/supplement changes: None noted    Signs or symptoms of bleeding or clotting: No    Previous INR: Subtherapeutic    Additional findings: New start day 8     PLAN     Recommended plan for ongoing change(s) affecting INR     Dosing Instructions:  Increase your warfarin dose (5.6% change) with next INR in 3 days       Summary  As of 1/3/2022    Full warfarin instructions:  5 mg every Tue, Fri; 7.5 mg all other days   Next INR check:  1/7/2022             Telephone call with Jairo who verbalizes understanding and agrees to plan and who agrees to plan and repeated back plan correctly    Lab visit scheduled    Education provided: Please call back if any changes to your diet, medications or how you've been taking warfarin and Contact [phone]  with any changes, questions or concerns.     Plan made per [facility] anticoagulation protocol    Ifeoma Hendricks, RN  Anticoagulation Clinic  1/3/2022    _______________________________________________________________________     Anticoagulation Episode Summary     Current INR goal:  2.0-3.0   TTR:  0.0 % (2 d)   Target end date:  Indefinite   Send INR reminders to:  CESARIO NEWSOME    Indications    Chronic atrial fibrillation (H) [I48.20]           Comments:           Anticoagulation Care Providers     Provider Role Specialty Phone number    Piedad Wade CNP Referring Nurse Practitioner - Gerontology [phone]    Jimbo Renteria MD Referring Internal  Medicine [phone]

## 2022-01-06 ENCOUNTER — LAB (OUTPATIENT)
Dept: LAB | Facility: CLINIC | Age: 62
End: 2022-01-06
Payer: MEDICARE

## 2022-01-06 ENCOUNTER — ANTICOAGULATION THERAPY VISIT (OUTPATIENT)
Dept: ANTICOAGULATION | Facility: CLINIC | Age: 62
End: 2022-01-06

## 2022-01-06 DIAGNOSIS — Z86.73 HISTORY OF STROKE: ICD-10-CM

## 2022-01-06 DIAGNOSIS — I48.20 CHRONIC ATRIAL FIBRILLATION (H): ICD-10-CM

## 2022-01-06 DIAGNOSIS — I48.20 CHRONIC ATRIAL FIBRILLATION (H): Primary | ICD-10-CM

## 2022-01-06 LAB — INR BLD: 4.2 (ref 0.9–1.1)

## 2022-01-06 PROCEDURE — 36416 COLLJ CAPILLARY BLOOD SPEC: CPT

## 2022-01-06 PROCEDURE — 85610 PROTHROMBIN TIME: CPT

## 2022-01-06 NOTE — PROGRESS NOTES
ANTICOAGULATION MANAGEMENT     Jairo JADE [place] 61 year old male is on warfarin with supratherapeutic INR result. (Goal INR 2.0-3.0)    Recent labs: (last 7 days)     01/06/22  1115   INR 4.2*       ASSESSMENT     Source(s): Chart Review, Patient/Caregiver Call and Template       Warfarin doses taken: More warfarin taken than planned which may be affecting INR    Diet: No new diet changes identified    New illness, injury, or hospitalization: No    Medication/supplement changes: None noted    Signs or symptoms of bleeding or clotting: No    Previous INR: Slightly subtherapeutic, noted rapid rise in 3 days with patient taking only slightly higher than advised dose. Also noted todays INR is POC vs venous on Monday.    Additional findings: New start on day 11 of warfarin     PLAN     Recommended plan for temporary change(s) affecting INR     Dosing Instructions: Hold dose then Decrease your warfarin dose (15% change) with next INR in 4 days. Note patient was only verbally given dosing instructions until next INR    Summary  As of 1/6/2022    Full warfarin instructions:  1/6: Hold; Otherwise 7.5 mg every Mon, Thu; 5 mg all other days   Next INR check:  1/10/2022             Telephone call with Jairo who verbalizes understanding and agrees to plan    Lab visit scheduled    Education provided: Goal range and significance of current result    Plan made with [facility] Pharmacist Mariella Arroyo, RN  Anticoagulation Clinic  1/6/2022    _______________________________________________________________________     Anticoagulation Episode Summary     Current INR goal:  2.0-3.0   TTR:  24.8 % (5 d)   Target end date:  Indefinite   Send INR reminders to:  CESARIO NEWSOME    Indications    Chronic atrial fibrillation (H) [I48.20]           Comments:           Anticoagulation Care Providers     Provider Role Specialty Phone number    Piedad Wade CNP Referring Nurse Practitioner - Gerontology [phone]     Jimbo Renteria MD [facility] Internal Medicine [phone]

## 2022-01-10 ENCOUNTER — ANTICOAGULATION THERAPY VISIT (OUTPATIENT)
Dept: ANTICOAGULATION | Facility: CLINIC | Age: 62
End: 2022-01-10

## 2022-01-10 ENCOUNTER — PATIENT OUTREACH (OUTPATIENT)
Dept: CARE COORDINATION | Facility: CLINIC | Age: 62
End: 2022-01-10

## 2022-01-10 ENCOUNTER — LAB (OUTPATIENT)
Dept: LAB | Facility: CLINIC | Age: 62
End: 2022-01-10
Payer: MEDICARE

## 2022-01-10 ENCOUNTER — TELEPHONE (OUTPATIENT)
Dept: INTERNAL MEDICINE | Facility: CLINIC | Age: 62
End: 2022-01-10

## 2022-01-10 DIAGNOSIS — Z86.73 HISTORY OF STROKE: ICD-10-CM

## 2022-01-10 DIAGNOSIS — I48.20 CHRONIC ATRIAL FIBRILLATION (H): ICD-10-CM

## 2022-01-10 DIAGNOSIS — I48.20 CHRONIC ATRIAL FIBRILLATION (H): Primary | ICD-10-CM

## 2022-01-10 LAB — INR BLD: 2.4 (ref 0.9–1.1)

## 2022-01-10 PROCEDURE — 36416 COLLJ CAPILLARY BLOOD SPEC: CPT

## 2022-01-10 PROCEDURE — 85610 PROTHROMBIN TIME: CPT

## 2022-01-10 NOTE — PROGRESS NOTES
Clinic Care Coordination Contact  Program: [facility] benefits and Medicare Saving plan.   County: [facility] Case #:  [facility] Worker:   Sandra #:   Subscriber #:   Renewal:  Date Applied: 12/17/2021    FRW Outreach:   1/10/2022: FRW called to follow up with patient and he stated that he did not get his application in the mail FRW to mail it out again on 1/11/2022 and will follow up in 2 weeks to make sure he got it.   12/17/2021: FRW completed paper application for medicare saving plan and mailed it to the patient. And completed one line application for SANP.       Health Insurance:      Referral/Screening:    Financial Resource Worker Screening     [facility] Benefits  Is patient requesting help applying for [facility] benefits?: Yes  Have you recently applied for any [facility] benefits?: No  How many people in your household?: 2  Do you buy/eat food together?: No  What is the monthly gross income for the household (wages, social security, workers comp, and pension)? : 640     Insurance:  [place] insurance?: No  Is this an insurance renewal?: No  Is this a new insurance application request?: Yes  Have you recently applied for insurance?: No  How many people in your household? : 2  Do you file taxes?: No     Any other information for the FRW?: This gentleman recently moved from [place] and staying with daughter. Please assist with applications for [place], Medicare Savings Program, SNAP and cash assistance (if he qualifies) Hx stroke/bypass so processes information slowly (ex: writing down numbers etc)  Goals Addressed:

## 2022-01-10 NOTE — PROGRESS NOTES
ANTICOAGULATION MANAGEMENT     Jairo Atwoodman 61 year old male is on warfarin with therapeutic INR result. (Goal INR 2.0-3.0)    Recent labs: (last 7 days)     01/10/22  1031   INR 2.4*       ASSESSMENT     Source(s): Chart Review, Patient/Caregiver Call and Template       Warfarin doses taken: Warfarin taken as instructed    Diet: No new diet changes identified    New illness, injury, or hospitalization: No    Medication/supplement changes: None noted    Signs or symptoms of bleeding or clotting: No    Previous INR: Supratherapeutic    Additional findings: None     PLAN     Recommended plan for no diet, medication or health factor changes affecting INR     Dosing Instructions: Continue your current warfarin dose with next INR in 3 days       Summary  As of 1/10/2022    Full warfarin instructions:  7.5 mg every Mon, Thu; 5 mg all other days   Next INR check:  1/13/2022             Telephone call with Jaior who verbalizes understanding and agrees to plan    Lab visit scheduled    Education provided: Goal range and significance of current result and Importance of therapeutic range    Plan made per [facility] anticoagulation protocol    Roseline Weiss RN  Anticoagulation Clinic  1/10/2022    _______________________________________________________________________     Anticoagulation Episode Summary     Current INR goal:  2.0-3.0   TTR:  28.4 % (1.3 wk)   Target end date:  Indefinite   Send INR reminders to:  CESARIO NEWSOME    Indications    Chronic atrial fibrillation (H) [I48.20]           Comments:           Anticoagulation Care Providers     Provider Role Specialty Phone number    Piedad Wade CNP Referring Nurse Practitioner - Gerontology [phone]    Jimbo Renteria MD Referring Internal Medicine [phone]

## 2022-01-10 NOTE — TELEPHONE ENCOUNTER
Reason for Call:  Patient calling and states that he needs a form filled out by PCP for transportation to the clinic due to increased visits for his INR  Patient does not have a form and was told that the clinic should have the form.   Phone Number Patient can be reached at: Cell number on file:    Telephone Information:   Mobile [phone]       Best Time: anytime    Can we leave a detailed message on this number? YES    Call taken on 1/10/2022 at 11:26 AM by Maci Butler

## 2022-01-11 NOTE — TELEPHONE ENCOUNTER
Unsure of any transportation programs except for [employer]. I did print this form off the internet if patient qualifies. Patient is willing to come in for an appointment to get form filled out if needed.     Does patient qualify for [employer]? Form placed in Piedad's basket for review.    Mila Rose CMA ............... 10:04 AM, 01/11/22

## 2022-01-12 ENCOUNTER — PATIENT OUTREACH (OUTPATIENT)
Dept: NURSING | Facility: CLINIC | Age: 62
End: 2022-01-12
Payer: MEDICARE

## 2022-01-12 NOTE — PROGRESS NOTES
Clinic Care Coordination Contact    Community Health Worker Follow Up    Care Gaps:     Health Maintenance Due   Topic Date Due     ADVANCE CARE PLANNING  Never done     COVID-19 Vaccine (1) Never done     COLORECTAL CANCER SCREENING  Never done     HIV SCREENING  Never done     MEDICARE ANNUAL WELLNESS VISIT  Never done     HEPATITIS C SCREENING  Never done     DTAP/TDAP/TD IMMUNIZATION (1 - Tdap) Never done     LIPID  Never done     ZOSTER IMMUNIZATION (1 of 2) Never done     INFLUENZA VACCINE (1) Never done     PHQ-2  01/01/2022       Declined due to waiting to get health insurance     Goals:   Goals Addressed as of 1/12/2022 at 11:07 AM                    Today    12/8/21       1- Medication (pt-stated)         Added 12/7/21 by Deepali Daniels LSW      Goal Statement: I will fill my prescriptions    Date Goal set: 12/7/21  Barriers: not taking medications  Strengths: willing to seek assistance   Date to Achieve By: 1 week  Patient expressed understanding of goal: yes    Action steps to achieve this goal:  1. I will contact [place] [facility] and provide necessary documentation to qualify for the program  2. I will attempt to fill my prescriptions at [facility] for those on their low cost list    ** Did not discuss goal today.         2-Financial Wellbeing (pt-stated)   10%  50%    Added 12/7/21 by Deepali Daniels LSW      Goal Statement: I will apply for health insurance, SNAP, Medicare Savings and Cash Assistance within the next 1-2 weeks if I am eligible.     Date Goal Set:  12/7/21  Strengths:  willing to seek assistance  Barriers: no insurance, low income  Date to Achieve By: 1-2 months  Patient expressed understanding of goal: yes    Action steps to achieve this goal:  1. I understand a referral was placed to the Financial Resource Worker, I will receive a call within the next 3 business days.    2. I understand the financial worker will make two attempts to call me. If I still need help with  this goal, I will connect with my Community Health Worker Usha at [phone].    3. I will provide any necessary documents to the [facility] if needed.      Goal updated: 1/12/22         3- Medical (pt-stated)   On Hold      Added 12/7/21 by Deepali Daniels LSW      Goal Statement: I will establish care with cardiology once I have health insurance     Date Goal set: 12/7/21  Barriers: no cardiologist  Strengths: order placed  Date to Achieve By: 3 months  Patient expressed understanding of goal: yes  Action steps to achieve this goal:  1. I will schedule and attend appointment  2. I will attend follow up appointment     ** On Hold until patient has health insurance.    Goal updated: 1/12/22         4- Functional (pt-stated)   40%      Added 12/7/21 by Deepali Daniels LSW      Goal Statement: I will be approved for [employer] transportation    Date Goal set: 12/7/21  Barriers: no PCP  Strengths: willingess  Date to Achieve By: 2 [facility]  Patient expressed understanding of goal: yes    Action steps to achieve this goal:  1. I will ask PCP on 12/22/21 to complete [employer] form. PCP is working to fill out the PCP portion of the application  2. I will complete and mail my portion of the application. Continuous (MB)    Goal Updated: 1/12/22         5- Psychosocial (pt-stated)   10%      Added 12/7/21 by Deepali Daniels LSW      Goal Statement: I will get on wait lists for subsidized housing    Date Goal set: 12/7/21  Barriers: temporary housing  Strengths: daughter  Date to Achieve By: 6 [facility]  Patient expressed understanding of goal: yes    Action steps to achieve this goal:  1. I will contact subsidized housing apartments and provide information needed to get on wait lists. I did not received the Care Plan with the housing resources. My CHW is mailing them out to me again today.    Goal Updated 1/12/22                   Intervention and Education during outreach: Patient did not received the Care Plan with  the [employer] resources. CHW is mailing them out to me again today.      CHW Plan: CHW will follow up with patient in 1 month on 2/14/22

## 2022-01-13 ENCOUNTER — ANTICOAGULATION THERAPY VISIT (OUTPATIENT)
Dept: ANTICOAGULATION | Facility: CLINIC | Age: 62
End: 2022-01-13

## 2022-01-13 ENCOUNTER — LAB (OUTPATIENT)
Dept: LAB | Facility: CLINIC | Age: 62
End: 2022-01-13
Payer: MEDICARE

## 2022-01-13 DIAGNOSIS — I48.20 CHRONIC ATRIAL FIBRILLATION (H): Primary | ICD-10-CM

## 2022-01-13 DIAGNOSIS — I48.20 CHRONIC ATRIAL FIBRILLATION (H): ICD-10-CM

## 2022-01-13 DIAGNOSIS — Z86.73 HISTORY OF STROKE: ICD-10-CM

## 2022-01-13 LAB — INR BLD: 2.5 (ref 0.9–1.1)

## 2022-01-13 PROCEDURE — 36416 COLLJ CAPILLARY BLOOD SPEC: CPT

## 2022-01-13 PROCEDURE — 85610 PROTHROMBIN TIME: CPT

## 2022-01-13 NOTE — PROGRESS NOTES
ANTICOAGULATION MANAGEMENT     Jairo Atwoodman 61 year old male is on warfarin with therapeutic INR result. (Goal INR 2.0-3.0)    Recent labs: (last 7 days)     01/13/22  0933   INR 2.5*       ASSESSMENT     Source(s): Chart Review, Patient/Caregiver Call and Template       Warfarin doses taken: Warfarin taken as instructed and Template incorrect; verbally confirmed home dose with Jairo     Diet: No new diet changes identified    New illness, injury, or hospitalization: Yes: he is not feeling well today.     Medication/supplement changes: None noted    Signs or symptoms of bleeding or clotting: No    Previous INR: Therapeutic last visit; previously outside of goal range    Additional findings: None     PLAN     Recommended plan for no diet, medication or health factor changes affecting INR     Dosing Instructions:  Decrease your warfarin dose (6.2% change) with next INR in 1 week   This dose is still more then he has taken in the last 7 days.     Summary  As of 1/13/2022    Full warfarin instructions:  7.5 mg every Mon; 5 mg all other days   Next INR check:  1/20/2022             Telephone call with Jairo who verbalizes understanding and agrees to plan    Check at provider office visit    Education provided: Goal range and significance of current result and Importance of therapeutic range    Plan made per [facility] anticoagulation protocol    Roseline Weiss RN  Anticoagulation Clinic  1/13/2022    _______________________________________________________________________     Anticoagulation Episode Summary     Current INR goal:  2.0-3.0   TTR:  45.5 % (1.7 wk)   Target end date:  Indefinite   Send INR reminders to:  CESARIO NEWSOME    Indications    Chronic atrial fibrillation (H) [I48.20]           Comments:           Anticoagulation Care Providers     Provider Role Specialty Phone number    Piedad Wade CNP Referring Nurse Practitioner - Gerontology [phone]    Jimbo Renteria MD Referring  Internal Medicine [phone]

## 2022-01-19 ENCOUNTER — OFFICE VISIT (OUTPATIENT)
Dept: INTERNAL MEDICINE | Facility: CLINIC | Age: 62
End: 2022-01-19
Payer: MEDICARE

## 2022-01-19 ENCOUNTER — ANTICOAGULATION THERAPY VISIT (OUTPATIENT)
Dept: ANTICOAGULATION | Facility: CLINIC | Age: 62
End: 2022-01-19

## 2022-01-19 VITALS
BODY MASS INDEX: 33.59 KG/M2 | DIASTOLIC BLOOD PRESSURE: 72 MMHG | HEART RATE: 88 BPM | WEIGHT: 248 LBS | OXYGEN SATURATION: 97 % | SYSTOLIC BLOOD PRESSURE: 124 MMHG | HEIGHT: 72 IN

## 2022-01-19 DIAGNOSIS — I48.20 CHRONIC ATRIAL FIBRILLATION (H): Primary | ICD-10-CM

## 2022-01-19 DIAGNOSIS — I48.20 CHRONIC ATRIAL FIBRILLATION (H): ICD-10-CM

## 2022-01-19 DIAGNOSIS — G47.00 PERSISTENT INSOMNIA: ICD-10-CM

## 2022-01-19 DIAGNOSIS — R06.02 SOB (SHORTNESS OF BREATH): ICD-10-CM

## 2022-01-19 DIAGNOSIS — I10 BENIGN ESSENTIAL HYPERTENSION: ICD-10-CM

## 2022-01-19 DIAGNOSIS — J45.40 MODERATE PERSISTENT ASTHMA WITHOUT COMPLICATION: ICD-10-CM

## 2022-01-19 DIAGNOSIS — N32.81 OVERACTIVE BLADDER: ICD-10-CM

## 2022-01-19 DIAGNOSIS — Z86.73 HISTORY OF STROKE: ICD-10-CM

## 2022-01-19 LAB
ANION GAP SERPL CALCULATED.3IONS-SCNC: 9 MMOL/L (ref 5–18)
BUN SERPL-MCNC: 10 MG/DL (ref 8–22)
CALCIUM SERPL-MCNC: 9 MG/DL (ref 8.5–10.5)
CHLORIDE BLD-SCNC: 104 MMOL/L (ref 98–107)
CO2 SERPL-SCNC: 28 MMOL/L (ref 22–31)
CREAT SERPL-MCNC: 0.86 MG/DL (ref 0.7–1.3)
GFR SERPL CREATININE-BSD FRML MDRD: >90 ML/MIN/1.73M2
GLUCOSE BLD-MCNC: 93 MG/DL (ref 70–125)
INR BLD: 2.5 (ref 0.9–1.1)
POTASSIUM BLD-SCNC: 3.9 MMOL/L (ref 3.5–5)
SODIUM SERPL-SCNC: 141 MMOL/L (ref 136–145)

## 2022-01-19 PROCEDURE — 36415 COLL VENOUS BLD VENIPUNCTURE: CPT | Performed by: NURSE PRACTITIONER

## 2022-01-19 PROCEDURE — 99214 OFFICE O/P EST MOD 30 MIN: CPT | Performed by: NURSE PRACTITIONER

## 2022-01-19 PROCEDURE — 36416 COLLJ CAPILLARY BLOOD SPEC: CPT | Performed by: NURSE PRACTITIONER

## 2022-01-19 PROCEDURE — 85610 PROTHROMBIN TIME: CPT | Performed by: NURSE PRACTITIONER

## 2022-01-19 PROCEDURE — 80048 BASIC METABOLIC PNL TOTAL CA: CPT | Performed by: NURSE PRACTITIONER

## 2022-01-19 RX ORDER — TRAZODONE HYDROCHLORIDE 50 MG/1
50 TABLET, FILM COATED ORAL AT BEDTIME
Qty: 90 TABLET | Refills: 3 | Status: SHIPPED | OUTPATIENT
Start: 2022-01-19

## 2022-01-19 RX ORDER — WARFARIN SODIUM 5 MG/1
5 TABLET ORAL DAILY
Qty: 90 TABLET | Refills: 3 | Status: SHIPPED | OUTPATIENT
Start: 2022-01-19 | End: 2022-02-09

## 2022-01-19 RX ORDER — OXYBUTYNIN CHLORIDE 5 MG/1
TABLET, EXTENDED RELEASE ORAL
Qty: 53 TABLET | Refills: 0 | Status: SHIPPED | OUTPATIENT
Start: 2022-01-19 | End: 2022-02-18

## 2022-01-19 RX ORDER — LEVALBUTEROL TARTRATE 45 UG/1
1-2 AEROSOL, METERED ORAL EVERY 6 HOURS PRN
Qty: 15 G | Refills: 3 | Status: SHIPPED | OUTPATIENT
Start: 2022-01-19 | End: 2022-02-09

## 2022-01-19 ASSESSMENT — MIFFLIN-ST. JEOR: SCORE: 1972.92

## 2022-01-19 NOTE — PROGRESS NOTES
Clinic Note    Assessment:     Assessment and Plan:  1. Chronic atrial fibrillation (H)/History of a stroke: Will recheck his INR. He continues on Carvedilol and Warfarin. Stable.   - warfarin ANTICOAGULANT (COUMADIN) 5 MG tablet; Take 1 tablet (5 mg) by mouth daily  Dispense: 90 tablet; Refill: 3    2. SOB (shortness of breath)/Moderate persistent asthma without complication: Refilled his breo and xopenex inhalers. Stable.   - levalbuterol (XOPENEX HFA) 45 MCG/ACT inhaler; Inhale 1-2 puffs into the lungs every 6 hours as needed for shortness of breath / dyspnea or wheezing  Dispense: 15 g; Refill: 3  - fluticasone-vilanterol (BREO ELLIPTA) 100-25 MCG/INH inhaler; Inhale 1 puff into the lungs daily  Dispense: 60 each; Refill: 3    3. Overactive bladder: Will start on Ditropan and see if this is helpful calming his bladder down. He is drinking mostly water, not caffeine. Last Urinalysis was normal.   - oxybutynin ER (DITROPAN-XL) 5 MG 24 hr tablet; Take 1 tablet (5 mg) by mouth daily for 7 days, THEN 2 tablets (10 mg) daily for 23 days.  Dispense: 53 tablet; Refill: 0    4. Benign essential hypertension: Blood pressure today was 124/72. He continues on Carvedilol and Norvasc. Stable.   - Basic metabolic panel  (Ca, Cl, CO2, Creat, Gluc, K, Na, BUN); Future    5. Persistent insomnia: Trouble falling and staying asleep. Will try Trazodone. May repeat dosing if waking up and not falling back asleep. Follow up in three weeks.   - traZODone (DESYREL) 50 MG tablet; Take 1 tablet (50 mg) by mouth At Bedtime  Dispense: 90 tablet; Refill: 3     Patient Instructions     Start the oxybutynin 5 mg daily for 1 week, then increase to 10 mg or 2 tablets daily.  This should help calm down your bladder.    Try the trazodone 50 mg prior to bedtime.  If waking up in the middle night okay to take a second dose.  If having worsening nightmares, homicidal or or suicidal thoughts stop the medication update me immediately.    Your labs are  processing at this time, we will call with results once they are back.    I would like to see you back in 3 weeks for follow-up of your insomnia and overactive bladder, before then if anything comes up.  Patient Education     Overactive Bladder Syndrome (OAB)  When the bladder muscles squeeze (contract) involuntarily, it is called overactive bladder syndrome (OAB). This causes an intense urge to urinate, called urgency. Urgency can occur many times during the day and night. If urine leaks with the urgency, it is called urge incontinence.   A disease that affects the bladder nerves, such as multiple sclerosis, can cause overactive bladder syndrome. Other conditions can also lead to OAB. These include urinary tract infection (UTI) or prostate problems in men. But the exact cause is often not known.      Normally, urine stays in the bladder until a person decides to release it. With OAB, the bladder muscles contract involuntarily, causing a sudden urge to urinate and even urine leakage.   How is overactive bladder syndrome diagnosed?   Your healthcare provider will examine you and ask about your symptoms and health history. You may also have 1 or more of these tests:     Urine test. Urine samples are taken and checked for problems.    Urinary diary. You record how much fluid you take in and urinate out for 3 days.    Bladder ultrasound. This studies the bladder as it empties. Ultrasound uses sound waves to make detailed images of the inside of the body.    Cystoscopy. This test lets the provider look for problems in the urinary tract. The test uses a thin, flexible scope (cystoscope) with a light and camera on the end. The scope is put into the tube that takes urine out of the body (urethra).    Urodynamic studies. This is a group of tests. They measure and record many aspects of urinary bladder function. This includes pressures, volume, and urine flow.  How is overactive bladder syndrome treated?   Treatment depends  on the cause and severity of your OAB. Treatments may include:     Changing your urination habits. Your healthcare provider may advise you to urinate as soon as you feel the urge. You may also need to limit how much fluid you have during the day.    Exercising your pelvic muscles. This can help strengthen muscles used during urination. These exercises are called Kegels. They include laurence as if you were stopping your urine stream. And tightening your rectum as if trying not to pass gas. Your provider can help you learn how to do Kegels.    Biofeedback. This can help you learn to control the movement of your bladder muscles. Sensors are placed on your belly. They turn signals given off by your muscles into lines on a computer screen.    Medicine. This may be given to relax the bladder muscle. Medicine can also help ease bladder contractions. This reduces the urge to urinate.    Neuromodulation. This may be done if medicine and behavioral changes don t work. Electrical pulses are sent to the nerves that affect the pelvic area (sacral nerves). These pulses help relieve OAB and urge incontinence.    Botulinum toxin shots. These can be injected into the muscle of the bladder to relax the muscles.    Surgery. When less invasive treatments don't work, surgery to make the bladder larger may be done in severe cases.  With treatment, OAB can be managed. A condition, such as UTI, that has led to your OAB will be treated. Treatment may include taking medicine for months or years. You may also need to make changes in your daily routine. This may include going to the bathroom more often than you think you need to. Or you may need to limit caffeine and alcohol because these can make symptoms worse. Your healthcare provider can tell you more.   When to call your healthcare provider  Call the healthcare provider right away if you have any of these:     Fever of  100.4  F ( 38.0 C ) or higher, or as directed by your  provider    Symptoms don't get better, or get worse with treatment    Trouble urinating because of pain    Back or belly pain  [employer] last reviewed this educational content on 6/1/2019 2000-2021 [employer]. All rights reserved. This information is not intended as a substitute for professional medical care. Always follow your healthcare professional's instructions.             Return in about 3 weeks (around 2/9/2022) for Follow up.         Subjective:      Jairo Quevedo is a 61 year old male presents today for follow up.    He reports continued insomnia. He is not sleeping well at night, over the past three nights, he has not slept much. He would like to try medication to help him sleep.    He reports that the flomax has been helpful with his night time urination, but he continues to go multiple times throughout the day. He does not have any burning or other symptoms. Will try treating this with Oxybutynin.    He also reports that he needs his Breo inhaler refilled for his asthma, he is almost out.    He reports otherwise doing okay, and offers no other concerns today.     The following portions of the patient's history were reviewed and updated as appropriate.    Review of Systems:    Review is otherwise negative except for what is mentioned above.     Social Hx:    History   Smoking Status     Never Smoker   Smokeless Tobacco     Never Used         Objective:     Vitals:    01/19/22 0959   BP: 124/72   BP Location: Right arm   Patient Position: Sitting   Cuff Size: Adult Regular   Pulse: 88   SpO2: 97%   Weight: 112.5 kg (248 lb)   Height: 1.829 m (6')     Physical Exam  Vitals and nursing note reviewed.   Constitutional:       General: He is not in acute distress.     Appearance: Normal appearance. He is obese. He is not ill-appearing, toxic-appearing or diaphoretic.   HENT:      Head: Normocephalic and atraumatic.      Right Ear: Tympanic membrane, ear canal and external ear normal.       Left Ear: Tympanic membrane, ear canal and external ear normal.      Nose: Nose normal.      Mouth/Throat:      Mouth: Mucous membranes are dry.      Pharynx: Oropharynx is clear.   Eyes:      Extraocular Movements: Extraocular movements intact.      Conjunctiva/sclera: Conjunctivae normal.      Pupils: Pupils are equal, round, and reactive to light.   Cardiovascular:      Rate and Rhythm: Normal rate and regular rhythm.      Pulses: Normal pulses.      Heart sounds: Normal heart sounds. No murmur heard.  No friction rub. No gallop.    Pulmonary:      Effort: Pulmonary effort is normal.      Breath sounds: Normal breath sounds.   Musculoskeletal:         General: Normal range of motion.      Cervical back: Normal range of motion and neck supple.   Lymphadenopathy:      Cervical: No cervical adenopathy.   Skin:     General: Skin is warm and dry.      Capillary Refill: Capillary refill takes less than 2 seconds.   Neurological:      General: No focal deficit present.      Mental Status: He is alert and oriented to person, place, and time. Mental status is at baseline.   Psychiatric:         Mood and Affect: Mood normal.         Behavior: Behavior normal.         Thought Content: Thought content normal.         Judgment: Judgment normal.         Patient Active Problem List   Diagnosis     Chronic atrial fibrillation (H)     Current Outpatient Medications   Medication Sig Dispense Refill     amLODIPine (NORVASC) 10 MG tablet Take 1 tablet (10 mg) by mouth daily 90 tablet 1     atorvastatin (LIPITOR) 80 MG tablet Take 1 tablet (80 mg) by mouth daily 90 tablet 1     carvedilol (COREG) 25 MG tablet Take 1 tablet (25 mg) by mouth 2 times daily (with meals) 60 tablet 1     fluticasone-vilanterol (BREO ELLIPTA) 100-25 MCG/INH inhaler Inhale 1 puff into the lungs daily 60 each 3     gabapentin (NEURONTIN) 300 MG capsule Take 1 capsule (300 mg) by mouth 3 times daily 90 capsule 1     levalbuterol (XOPENEX HFA) 45 MCG/ACT  inhaler Inhale 1-2 puffs into the lungs every 6 hours as needed for shortness of breath / dyspnea or wheezing 15 g 3     methocarbamol (ROBAXIN) 500 MG tablet Take 1 tablet (500 mg) by mouth 3 times daily as needed for muscle spasms 21 tablet 0     oxybutynin ER (DITROPAN-XL) 5 MG 24 hr tablet Take 1 tablet (5 mg) by mouth daily for 7 days, THEN 2 tablets (10 mg) daily for 23 days. 53 tablet 0     tamsulosin (FLOMAX) 0.4 MG capsule Take 1 capsule (0.4 mg) by mouth every evening 30 capsule 1     traZODone (DESYREL) 50 MG tablet Take 1 tablet (50 mg) by mouth At Bedtime 90 tablet 3     warfarin ANTICOAGULANT (COUMADIN) 5 MG tablet Take 1 tablet (5 mg) by mouth daily 90 tablet 3     Piedad Wade, Adult-Geriatric Nurse Practitioner  [facility] - Internal Medicine Team     1/19/2022

## 2022-01-19 NOTE — PROGRESS NOTES
ANTICOAGULATION MANAGEMENT     Jairo Atwoodman 61 year old male is on warfarin with therapeutic INR result. (Goal INR 2.0-3.0)    Recent labs: (last 7 days)     01/19/22  1048   INR 2.5*       ASSESSMENT     Source(s): Chart Review and Patient/Caregiver Call       Warfarin doses taken: Warfarin taken as instructed    Diet: No new diet changes identified    New illness, injury, or hospitalization: No    Medication/supplement changes: None noted    Signs or symptoms of bleeding or clotting: No    Previous INR: Therapeutic last 2(+) visits    Additional findings: None     PLAN     Recommended plan for no diet, medication or health factor changes affecting INR     Dosing Instructions: Continue your current warfarin dose with next INR in 1 week       Summary  As of 1/19/2022    Full warfarin instructions:  7.5 mg every Mon; 5 mg all other days   Next INR check:  1/26/2022             Telephone call with Jairo who verbalizes understanding and agrees to plan    Lab visit scheduled    Education provided: None required    Plan made per [facility] anticoagulation protocol    Malgorzata Thompson RN  Anticoagulation Clinic  1/19/2022    _______________________________________________________________________     Anticoagulation Episode Summary     Current INR goal:  2.0-3.0   TTR:  63.4 % (2.6 wk)   Target end date:  Indefinite   Send INR reminders to:  CESARIO NEWSOME    Indications    Chronic atrial fibrillation (H) [I48.20]           Comments:           Anticoagulation Care Providers     Provider Role Specialty Phone number    Piedad Wade CNP Referring Nurse Practitioner - Gerontology [phone]    Jimbo Renteria MD Referring Internal Medicine [phone]

## 2022-01-19 NOTE — PATIENT INSTRUCTIONS
Start the oxybutynin 5 mg daily for 1 week, then increase to 10 mg or 2 tablets daily.  This should help calm down your bladder.    Try the trazodone 50 mg prior to bedtime.  If waking up in the middle night okay to take a second dose.  If having worsening nightmares, homicidal or or suicidal thoughts stop the medication update me immediately.    Your labs are processing at this time, we will call with results once they are back.    I would like to see you back in 3 weeks for follow-up of your insomnia and overactive bladder, before then if anything comes up.  Patient Education     Overactive Bladder Syndrome (OAB)  When the bladder muscles squeeze (contract) involuntarily, it is called overactive bladder syndrome (OAB). This causes an intense urge to urinate, called urgency. Urgency can occur many times during the day and night. If urine leaks with the urgency, it is called urge incontinence.   A disease that affects the bladder nerves, such as multiple sclerosis, can cause overactive bladder syndrome. Other conditions can also lead to OAB. These include urinary tract infection (UTI) or prostate problems in men. But the exact cause is often not known.      Normally, urine stays in the bladder until a person decides to release it. With OAB, the bladder muscles contract involuntarily, causing a sudden urge to urinate and even urine leakage.   How is overactive bladder syndrome diagnosed?   Your healthcare provider will examine you and ask about your symptoms and health history. You may also have 1 or more of these tests:     Urine test. Urine samples are taken and checked for problems.    Urinary diary. You record how much fluid you take in and urinate out for 3 days.    Bladder ultrasound. This studies the bladder as it empties. Ultrasound uses sound waves to make detailed images of the inside of the body.    Cystoscopy. This test lets the provider look for problems in the urinary tract. The test uses a thin,  flexible scope (cystoscope) with a light and camera on the end. The scope is put into the tube that takes urine out of the body (urethra).    Urodynamic studies. This is a group of tests. They measure and record many aspects of urinary bladder function. This includes pressures, volume, and urine flow.  How is overactive bladder syndrome treated?   Treatment depends on the cause and severity of your OAB. Treatments may include:     Changing your urination habits. Your healthcare provider may advise you to urinate as soon as you feel the urge. You may also need to limit how much fluid you have during the day.    Exercising your pelvic muscles. This can help strengthen muscles used during urination. These exercises are called Kegels. They include laurence as if you were stopping your urine stream. And tightening your rectum as if trying not to pass gas. Your provider can help you learn how to do Kegels.    Biofeedback. This can help you learn to control the movement of your bladder muscles. Sensors are placed on your belly. They turn signals given off by your muscles into lines on a computer screen.    Medicine. This may be given to relax the bladder muscle. Medicine can also help ease bladder contractions. This reduces the urge to urinate.    Neuromodulation. This may be done if medicine and behavioral changes don t work. Electrical pulses are sent to the nerves that affect the pelvic area (sacral nerves). These pulses help relieve OAB and urge incontinence.    Botulinum toxin shots. These can be injected into the muscle of the bladder to relax the muscles.    Surgery. When less invasive treatments don't work, surgery to make the bladder larger may be done in severe cases.  With treatment, OAB can be managed. A condition, such as UTI, that has led to your OAB will be treated. Treatment may include taking medicine for months or years. You may also need to make changes in your daily routine. This may include going to  the bathroom more often than you think you need to. Or you may need to limit caffeine and alcohol because these can make symptoms worse. Your healthcare provider can tell you more.   When to call your healthcare provider  Call the healthcare provider right away if you have any of these:     Fever of  100.4  F ( 38.0 C ) or higher, or as directed by your provider    Symptoms don't get better, or get worse with treatment    Trouble urinating because of pain    Back or belly pain  [employer] last reviewed this educational content on 6/1/2019 2000-2021 [employer]. All rights reserved. This information is not intended as a substitute for professional medical care. Always follow your healthcare professional's instructions.

## 2022-01-24 ENCOUNTER — PATIENT OUTREACH (OUTPATIENT)
Dept: CARE COORDINATION | Facility: CLINIC | Age: 62
End: 2022-01-24
Payer: MEDICARE

## 2022-01-24 NOTE — PROGRESS NOTES
Clinic Care Coordination Contact  Program: [facility] benefits and Medicare Saving plan.   County: [facility] Case #:  [facility] Worker:   Sandra #:   Subscriber #:   Renewal:  Date Applied: 12/17/2021    FRW Outreach:   1/24/2022: Outreach attempted x 1. Left message on voicemail with call back information and requested return call.  Plan: FRW will call again within one week.   1/10/2022: FRW called to follow up with patient and he stated that he did not get his application in the mail FRW to mail it out again on 1/11/2022 and will follow up in 2 weeks to make sure he got it.   12/17/2021: FRW completed paper application for medicare saving plan and mailed it to the patient. And completed one line application for SANP.       Health Insurance:      Referral/Screening:    Financial Resource Worker Screening     [facility] Benefits  Is patient requesting help applying for [facility] benefits?: Yes  Have you recently applied for any [facility] benefits?: No  How many people in your household?: 2  Do you buy/eat food together?: No  What is the monthly gross income for the household (wages, social security, workers comp, and pension)? : 640     [place]:  [place] No  Is this an insurance renewal?: No  Is this a new insurance application request?: Yes  Have you recently applied for insurance?: No  How many people in your household? : 2  Do you file taxes?: No     Any other information for the FRW?: This gentleman recently moved from [place] and staying with daughter. Please assist with applications for [place], Medicare Savings Program, SNAP and cash assistance (if he qualifies) Hx stroke/bypass so processes information slowly (ex: writing down numbers etc)  Goals Addressed:

## 2022-01-25 ENCOUNTER — PATIENT OUTREACH (OUTPATIENT)
Dept: CARE COORDINATION | Facility: CLINIC | Age: 62
End: 2022-01-25
Payer: MEDICARE

## 2022-01-25 NOTE — PROGRESS NOTES
Care Coordination Clinician Chart Review  Situation: Patient chart reviewed by care coordinator.       Background: Care Coordination initial assessment and enrollment to Care Coordination was December 2021.   Patient centered goals were developed with participation from patient.  BETH YOU handed patient off to CHW for continued outreach every 30 days.        Assessment: Per chart review, patient outreach completed by CC CHW on 1/12/22.  Patient is actively working to accomplish goals.  Patient's goals remain appropriate and relevant at this time.   Patient is not due for updated Plan of Care.  Annual assessment will be due December 2022.      Goals        1- Medication (pt-stated)       Goal Statement: I will fill my prescriptions    Date Goal set: 12/7/21  Barriers: not taking medications  Strengths: willing to seek assistance   Date to Achieve By: 1 week  Patient expressed understanding of goal: yes    Action steps to achieve this goal:  1. I will contact [place] [facility] and provide necessary documentation to qualify for the program  2. I will attempt to fill my prescriptions at [facility] for those on their low cost list    ** Did not discuss goal today.         2-Financial Wellbeing (pt-stated)       Goal Statement: I will apply for health insurance, SNAP, Medicare Savings and Cash Assistance within the next 1-2 weeks if I am eligible.     Date Goal Set:  12/7/21  Strengths:  willing to seek assistance  Barriers: no insurance, low income  Date to Achieve By: 1-2 months  Patient expressed understanding of goal: yes    Action steps to achieve this goal:  1. I understand a referral was placed to the Financial Resource Worker, I will receive a call within the next 3 business days.    2. I understand the financial worker will make two attempts to call me. If I still need help with this goal, I will connect with my Community Health Worker Usha at [phone].    3. I will provide any necessary documents  to the [facility] if needed.      Goal updated: 1/12/22         3- Medical (pt-stated)       Goal Statement: I will establish care with cardiology once I have health insurance     Date Goal set: 12/7/21  Barriers: no cardiologist  Strengths: order placed  Date to Achieve By: 3 months  Patient expressed understanding of goal: yes  Action steps to achieve this goal:  1. I will schedule and attend appointment  2. I will attend follow up appointment     ** On Hold until patient has health insurance.    Goal updated: 1/12/22         4- Functional (pt-stated)       Goal Statement: I will be approved for [employer] transportation    Date Goal set: 12/7/21  Barriers: no PCP  Strengths: willingess  Date to Achieve By: 2 [facility]  Patient expressed understanding of goal: yes    Action steps to achieve this goal:  1. I will ask PCP on 12/22/21 to complete [employer] form. PCP is working to fill out the PCP portion of the application  2. I will complete and mail my portion of the application. Continuous (MB)    Goal Updated: 1/12/22         5- Psychosocial (pt-stated)       Goal Statement: I will get on wait lists for subsidized housing    Date Goal set: 12/7/21  Barriers: temporary housing  Strengths: daughter  Date to Achieve By: 6 [facility]  Patient expressed understanding of goal: yes    Action steps to achieve this goal:  1. I will contact subsidized housing apartments and provide information needed to get on wait lists. I did not received the Care Plan with the housing resources. My CHW is mailing them out to me again today.    Goal Updated 1/12/22                       Plan/Recommendations: The patient will continue working with Care Coordination to achieve goals as above.  CHW will involve SW CC as needed or if patient is ready to move to maintenance.  SW CC will continue to monitor progress to goals and CHW outreaches every 6 weeks.   Plan of Care updated and mailed to patient: No

## 2022-01-26 ENCOUNTER — LAB (OUTPATIENT)
Dept: LAB | Facility: CLINIC | Age: 62
End: 2022-01-26
Payer: MEDICARE

## 2022-01-26 ENCOUNTER — ANTICOAGULATION THERAPY VISIT (OUTPATIENT)
Dept: ANTICOAGULATION | Facility: CLINIC | Age: 62
End: 2022-01-26

## 2022-01-26 DIAGNOSIS — Z86.73 HISTORY OF STROKE: ICD-10-CM

## 2022-01-26 DIAGNOSIS — I48.20 CHRONIC ATRIAL FIBRILLATION (H): Primary | ICD-10-CM

## 2022-01-26 DIAGNOSIS — I48.20 CHRONIC ATRIAL FIBRILLATION (H): ICD-10-CM

## 2022-01-26 LAB — INR BLD: 2 (ref 0.9–1.1)

## 2022-01-26 PROCEDURE — 85610 PROTHROMBIN TIME: CPT

## 2022-01-26 PROCEDURE — 36416 COLLJ CAPILLARY BLOOD SPEC: CPT

## 2022-01-26 NOTE — PROGRESS NOTES
ANTICOAGULATION MANAGEMENT     Jairo Quevedo 62 year old male is on warfarin with therapeutic INR result. (Goal INR 2.0-3.0)    Recent labs: (last 7 days)     01/26/22  0832   INR 2.0*       ASSESSMENT     Source(s): Chart Review, Patient/Caregiver Call and Template       Warfarin doses taken: Warfarin taken as instructed and Template incorrect; verbally confirmed home dose with Jairo     Diet: No new diet changes identified    New illness, injury, or hospitalization: No    Medication/supplement changes: Oxybutynin started on 1/19 No interaction anticipated    Trazadone started on 1/19 may increase risk of bleeding, but not expected to affect INR    Signs or symptoms of bleeding or clotting: No    Previous INR: Therapeutic last 2(+) visits    Additional findings: None     PLAN     Recommended plan for no diet, medication or health factor changes affecting INR     Dosing Instructions: Continue your current warfarin dose with next INR in 2 weeks       Summary  As of 1/26/2022    Full warfarin instructions:  7.5 mg every Mon; 5 mg all other days   Next INR check:  2/9/2022             Telephone call with Jairo who verbalizes understanding and agrees to plan    Check at provider office visit    Education provided: Goal range and significance of current result and Importance of therapeutic range    Plan made per [facility] anticoagulation protocol    Roseline Weiss RN  Anticoagulation Clinic  1/26/2022    _______________________________________________________________________     Anticoagulation Episode Summary     Current INR goal:  2.0-3.0   TTR:  73.3 % (3.6 wk)   Target end date:  Indefinite   Send INR reminders to:  CESARIO NEWSOME    Indications    Chronic atrial fibrillation (H) [I48.20]           Comments:           Anticoagulation Care Providers     Provider Role Specialty Phone number    Piedad Wade CNP Referring Nurse Practitioner - Gerontology [phone]    Jmibo Renteria MD Referring  Internal Medicine [phone]

## 2022-02-02 ENCOUNTER — PATIENT OUTREACH (OUTPATIENT)
Dept: CARE COORDINATION | Facility: CLINIC | Age: 62
End: 2022-02-02
Payer: MEDICARE

## 2022-02-02 NOTE — TELEPHONE ENCOUNTER
Clinic Care Coordination Contact  Program: [facility] benefits and Medicare Saving plan.   County: [facility] Case #:  [facility] Worker:   Sandra #:   Subscriber #:   Renewal:  Date Applied: 12/17/2021    FRW Outreach:  2/2/2022: FRW called patient to see if he received the applications for [facility] benefits and medicare savings program. Patient confirmed he received the applications and sent to [facility] last week. FRW will touch base with patient in 3 weeks to check on status from the [facility].   1/24/2022: Outreach attempted x 1. Left message on voicemail with call back information and requested return call.  Plan: FRW will call again within one week.   1/10/2022: FRW called to follow up with patient and he stated that he did not get his application in the mail FRW to mail it out again on 1/11/2022 and will follow up in 2 weeks to make sure he got it.   12/17/2021: FRW completed paper application for medicare saving plan and mailed it to the patient. And completed one line application for SANP.       Health Insurance:      Referral/Screening:    Financial Resource Worker Screening     [facility] Benefits  Is patient requesting help applying for [facility] benefits?: Yes  Have you recently applied for any [facility] benefits?: No  How many people in your household?: 2  Do you buy/eat food together?: No  What is the monthly gross income for the household (wages, social security, workers comp, and pension)? : 640     [place]:  [place] No  Is this an insurance renewal?: No  Is this a new insurance application request?: Yes  Have you recently applied for insurance?: No  How many people in your household? : 2  Do you file taxes?: No     Any other information for the FRW?: This gentleman recently moved from [place] and staying with daughter. Please assist with applications for [place], Medicare Savings Program, SNAP and cash assistance (if he qualifies) Hx stroke/bypass so processes information slowly (ex:  writing down numbers etc)  Goals Addressed:

## 2022-02-09 ENCOUNTER — VIRTUAL VISIT (OUTPATIENT)
Dept: INTERNAL MEDICINE | Facility: CLINIC | Age: 62
End: 2022-02-09
Payer: MEDICARE

## 2022-02-09 DIAGNOSIS — N32.81 OVERACTIVE BLADDER: ICD-10-CM

## 2022-02-09 DIAGNOSIS — G47.00 PERSISTENT INSOMNIA: Primary | ICD-10-CM

## 2022-02-09 DIAGNOSIS — I48.20 CHRONIC ATRIAL FIBRILLATION (H): ICD-10-CM

## 2022-02-09 PROCEDURE — 99442 PR PHYSICIAN TELEPHONE EVALUATION 11-20 MIN: CPT | Mod: 95 | Performed by: NURSE PRACTITIONER

## 2022-02-09 RX ORDER — TRAZODONE HYDROCHLORIDE 100 MG/1
100 TABLET ORAL AT BEDTIME
Qty: 90 TABLET | Refills: 0 | Status: SHIPPED | OUTPATIENT
Start: 2022-02-09

## 2022-02-09 RX ORDER — OXYBUTYNIN CHLORIDE 5 MG/1
TABLET, EXTENDED RELEASE ORAL
Qty: 53 TABLET | Refills: 0 | Status: CANCELLED | OUTPATIENT
Start: 2022-02-09 | End: 2022-03-11

## 2022-02-09 RX ORDER — WARFARIN SODIUM 5 MG/1
5 TABLET ORAL DAILY
Qty: 90 TABLET | Refills: 3 | Status: SHIPPED | OUTPATIENT
Start: 2022-02-09

## 2022-02-09 RX ORDER — OXYBUTYNIN CHLORIDE 15 MG/1
15 TABLET, EXTENDED RELEASE ORAL DAILY
Qty: 90 TABLET | Refills: 3 | Status: SHIPPED | OUTPATIENT
Start: 2022-02-09

## 2022-02-09 NOTE — PROGRESS NOTES
Jairo is a 62 year old who is being evaluated via a billable telephone visit.      What phone number would you like to be contacted at? [phone]  How would you like to obtain your AVS? MyChart    Assessment & Plan     Chronic atrial fibrillation (H): Refilled medication today.  - warfarin ANTICOAGULANT (COUMADIN) 5 MG tablet  Dispense: 90 tablet; Refill: 3    Overactive bladder: Will increase to 15mg daily. Urology referral placed.  - oxybutynin ER (DITROPAN XL) 15 MG 24 hr tablet  Dispense: 90 tablet; Refill: 3  - Adult Urology Referral    Persistent insomnia: Ordered a sleep study. Will increase trazodone. Three week follow up.  - traZODone (DESYREL) 100 MG tablet  Dispense: 90 tablet; Refill: 0  - Sleep Home Study Referral      BMI:   Estimated body mass index is 33.63 kg/m  as calculated from the following:    Height as of 1/19/22: 1.829 m (6').    Weight as of 1/19/22: 112.5 kg (248 lb).       Return in about 3 weeks (around 3/2/2022) for Follow up.    Piedad Wade, [facility]   Jairo is a 62 year old who presents for the following health issues   HPI     He reports that the Trazodone has not been helping his sleeping. He has been taking the Trazodone 50mg at bedtime on a nightly basis. We will try increasing his dose of this to see if this is helpful.    We will also obtain a Sleep study.    He reports that his urinary urgency and frequency is somewhat better, but not much. He has been taking the Oxybuytnin, which has helped some. His urinalysis in the past has been normal. No other symptoms, will try increasing this as well, if this is not helpful, will refer him to Urology.    Review of Systems   Constitutional, HEENT, cardiovascular, pulmonary, GI, , musculoskeletal, neuro, skin, endocrine and psych systems are negative, except as otherwise noted.      Objective         Vitals:  No vitals were obtained today due to virtual visit.    Physical  Exam   healthy, alert and no distress  PSYCH: Alert and oriented times 3; coherent speech, normal   rate and volume, able to articulate logical thoughts, able   to abstract reason, no tangential thoughts, no hallucinations   or delusions  His affect is normal  RESP: No cough, no audible wheezing, able to talk in full sentences  Remainder of exam unable to be completed due to telephone visits    Phone call duration: 15 minutes

## 2022-02-10 ENCOUNTER — TELEPHONE (OUTPATIENT)
Dept: ANTICOAGULATION | Facility: CLINIC | Age: 62
End: 2022-02-10
Payer: MEDICARE

## 2022-02-10 NOTE — TELEPHONE ENCOUNTER
ANTICOAGULATION     Jairo Quevedo is overdue for INR check.      spoke with Jairo who declined to schedule a lab appointment at this time. If calling back please schedule as soon as possible. He is having transportation issues.     Roseline Weiss RN

## 2022-02-15 ENCOUNTER — PATIENT OUTREACH (OUTPATIENT)
Dept: CARE COORDINATION | Facility: CLINIC | Age: 62
End: 2022-02-15
Payer: MEDICARE

## 2022-02-15 NOTE — PROGRESS NOTES
Clinic Care Coordination Contact  [facility]/Voicemail    Clinical Data: Care Coordinator Outreach  Outreach attempted x 1.  Left message on patient's voicemail with call back information and requested return call.  Plan: Care Coordinator will try to reach patient again in 10 business days.    Next CHW outreach date: 3/2/22    [facility] Worker  [facility] Care Coordination   Office: [phone]

## 2022-02-17 ENCOUNTER — TELEPHONE (OUTPATIENT)
Dept: ANTICOAGULATION | Facility: CLINIC | Age: 62
End: 2022-02-17
Payer: MEDICARE

## 2022-02-17 NOTE — TELEPHONE ENCOUNTER
ANTICOAGULATION     Jairo Quevedo is overdue for INR check.      Left message for patient to call and schedule lab appointment as soon as possible. If returning call, please schedule.     Roseline Weiss RN

## 2022-02-18 DIAGNOSIS — Z95.1 S/P CABG (CORONARY ARTERY BYPASS GRAFT): ICD-10-CM

## 2022-02-18 RX ORDER — CARVEDILOL 25 MG/1
25 TABLET ORAL 2 TIMES DAILY WITH MEALS
Qty: 60 TABLET | Refills: 1 | Status: SHIPPED | OUTPATIENT
Start: 2022-02-18

## 2022-02-18 NOTE — TELEPHONE ENCOUNTER
2-18-22  Reason for Call:  Medication     Do you use a [facility]? [facility] in [place]    Name of the medication requested: carvedilol (COREG) 25 MG tablet    Other request: none    Can we leave a detailed message on this number? YES    Phone number patient can be reached at: Cell number on file:    Telephone Information:   Mobile [phone]       Best Time: antime    Call taken on 2/18/2022 at 8:17 AM by Christa Panchal

## 2022-02-23 ENCOUNTER — PATIENT OUTREACH (OUTPATIENT)
Dept: CARE COORDINATION | Facility: CLINIC | Age: 62
End: 2022-02-23
Payer: MEDICARE

## 2022-02-23 NOTE — PROGRESS NOTES
Clinic Care Coordination Contact  Program: [facility] benefits and Medicare Saving plan.   County: [facility] Case #:  [facility] Worker:   Darure #:   Subscriber #:   Renewal:  Date Applied: 12/17/2021    FRW Outreach:  2/23/2022:  FRW called patient and left a vm with call back information. FRW will make outreach next week  2/2/2022: FRW called patient to see if he received the applications for county benefits and medicare savings program. Patient confirmed he received the applications and sent to [facility] last week. FRW will touch base with patient in 3 weeks to check on status from the county.   1/24/2022: Outreach attempted x 1. Left message on voicemail with call back information and requested return call.  Plan: FRW will call again within one week.   1/10/2022: FRW called to follow up with patient and he stated that he did not get his application in the mail FRW to mail it out again on 1/11/2022 and will follow up in 2 weeks to make sure he got it.   12/17/2021: FRW completed paper application for medicare saving plan and mailed it to the patient. And completed one line application for SANP.       Health Insurance:      Referral/Screening:    Financial Resource Worker Screening     [facility] Benefits  Is patient requesting help applying for [facility] benefits?: Yes  Have you recently applied for any [facility] benefits?: No  How many people in your household?: 2  Do you buy/eat food together?: No  What is the monthly gross income for the household (wages, social security, workers comp, and pension)? : 640     [place]:  [place] No  Is this an insurance renewal?: No  Is this a new insurance application request?: Yes  Have you recently applied for insurance?: No  How many people in your household? : 2  Do you file taxes?: No     Any other information for the FRW?: This gentleman recently moved from [place] and staying with daughter. Please assist with applications for [place], Medicare Savings  Program, SNAP and cash assistance (if he qualifies) Hx stroke/bypass so processes information slowly (ex: writing down numbers etc)  Goals Addressed:

## 2022-02-25 ENCOUNTER — DOCUMENTATION ONLY (OUTPATIENT)
Dept: ANTICOAGULATION | Facility: CLINIC | Age: 62
End: 2022-02-25
Payer: MEDICARE

## 2022-02-25 NOTE — PROGRESS NOTES
ANTICOAGULATION     Jairo Quevedo is overdue for INR check.      Reminder letter sent    Roseline Weiss RN

## 2022-02-25 NOTE — LETTER
February 25, 2022      Jairo Quevedo  [address] [place] [address] [address] [place] [address]        You are currently under the care of [facility] for your warfarin (Coumadin ) therapy.  We are contacting you because our records show you were due for an INR on 2/9/22.    There are potentially serious risks when taking warfarin without careful monitoring and we want to make sure you are safely managed.  Routine INR monitoring is required for warfarin refills.     Please call [phone] as soon as possible to schedule an appointment.  If there has been a change in your care or other concerns, please let us know so we can help and or update our records.     Sincerely,       [facility]

## 2022-03-02 ENCOUNTER — VIRTUAL VISIT (OUTPATIENT)
Dept: INTERNAL MEDICINE | Facility: CLINIC | Age: 62
End: 2022-03-02
Payer: MEDICARE

## 2022-03-02 DIAGNOSIS — R06.02 SOB (SHORTNESS OF BREATH): Primary | ICD-10-CM

## 2022-03-02 PROCEDURE — 99207 PR NO BILLABLE SERVICE THIS VISIT: CPT | Performed by: NURSE PRACTITIONER

## 2022-03-02 NOTE — PROGRESS NOTES
Called the patient today and he reported a three day course of worsening shortness of breath, cough, and not being able to take a full deep breath. He is scared because his breathing is so bad.    Discussed calling 911, and getting evaluated in the ER for his breathing. He was not able to talk in full sentences on the phone today, he was tearful. He was in agreeement with this plan. Will no charge this visit today.

## 2022-03-04 ENCOUNTER — PATIENT OUTREACH (OUTPATIENT)
Dept: NURSING | Facility: CLINIC | Age: 62
End: 2022-03-04
Payer: MEDICARE

## 2022-03-04 NOTE — PROGRESS NOTES
Clinic Care Coordination Contact    Community Health Worker Follow Up    Care Gaps:     Health Maintenance Due   Topic Date Due     ASTHMA ACTION PLAN  Never done     ASTHMA CONTROL TEST  Never done     ADVANCE CARE PLANNING  Never done     COVID-19 Vaccine (1) Never done     Pneumococcal Vaccine: Pediatrics (0 to 5 Years) and At-Risk Patients (6 to 64 Years) (1 of 2 - PPSV23) Never done     COLORECTAL CANCER SCREENING  Never done     HIV SCREENING  Never done     MEDICARE ANNUAL WELLNESS VISIT  Never done     HEPATITIS C SCREENING  Never done     DTAP/TDAP/TD IMMUNIZATION (1 - Tdap) Never done     LIPID  Never done     ZOSTER IMMUNIZATION (1 of 2) Never done     INFLUENZA VACCINE (1) Never done       Postponed to next CHW outreach date on 3/9/22     Goals: Dis not discuss goals today because patient not feeling good.      Intervention and Education during outreach: CHW called patient today and patient was not feeling well have a hard time with breathing and swelling. CHW asked patient if I could get him to a triage nurse and he declined and let CHW know that he has an appointment with Cardiology 3/10/22.     CHW Plan: CHW will call patient back 3/9/22.    Usha Espinosa  Community Health Worker  [facility]  Clinic Care Coordination   Office: [phone]

## 2022-03-08 ENCOUNTER — PATIENT OUTREACH (OUTPATIENT)
Dept: CARE COORDINATION | Facility: CLINIC | Age: 62
End: 2022-03-08
Payer: MEDICARE

## 2022-03-08 NOTE — PROGRESS NOTES
Clinic Care Coordination Contact  Program: [place] benefits and Medicare [place]aving plan.   Date Applied: 12/17/2021    FRW Outreach:  3/8/2022: FRw called patient and he stated that he was denied for a medicare saving program at this time. FRW Informed patient about [facility] care and he did not want to do that. FRW is removing herself form the care team.  2/23/2022:  FRW called patient and left a vm with call back information. FRW will make outreach next week  2/2/2022: FRW called patient to see if he received the applications for [facility] benefits and medicare savings program. Patient confirmed he received the applications and sent to [facility] last week. FRW will touch base with patient in 3 weeks to check on status from the [facility].   1/24/2022: Outreach attempted x 1. Left message on voicemail with call back information and requested return call.  Plan: FRW will call again within one week.   1/10/2022: FRW called to follow up with patient and he stated that he did not get his application in the mail FRW to mail it out again on 1/11/2022 and will follow up in 2 weeks to make sure he got it.   12/17/2021: FRW completed paper application for medicare saving plan and mailed it to the patient. And completed one line application for [employer].       Health Insurance:      Referral/Screening:    Financial Resource Worker Screening     [facility] Benefits  Is patient requesting help applying for [facility] benefits?: Yes  Have you recently applied for any [facility] benefits?: No  How many people in your household?: 2  Do you buy/eat food together?: No  What is the monthly gross income for the household (wages, social security, workers comp, and pension)? : 640     Ins[place]:  [place] insurance?: No  Is this an insurance renewal?: No  Is this a new insurance application request?: Yes  Have you recently applied for insurance?: No  How many people in your household? : 2  Do you file taxes?: No     Any other  information for the FRW?: This gentleman recently moved from [place] and staying with daughter. Please assist with applications for [place], Medicare Savings Program, SNAP and cash assistance (if he qualifies) Hx stroke/bypass so processes information slowly (ex: writing down numbers etc)  Goals Addressed:

## 2022-03-11 ENCOUNTER — PATIENT OUTREACH (OUTPATIENT)
Dept: CARE COORDINATION | Facility: CLINIC | Age: 62
End: 2022-03-11
Payer: MEDICARE

## 2022-03-11 NOTE — PROGRESS NOTES
Clinic Care Coordination Contact  [facility]/Voicemail    Clinical Data: Care Coordinator Outreach  Outreach attempted x 1. No answer, no VM available.  Plan: Care Coordinator will try to reach patient again in 10 business days.    Next CHW outreach date: 3/24/22    Usha Espinosa  [facility] Worker  [facility] Care Coordination   Office: [phone]

## 2022-03-17 ENCOUNTER — TELEPHONE (OUTPATIENT)
Dept: FAMILY MEDICINE | Facility: CLINIC | Age: 62
End: 2022-03-17

## 2022-03-21 ENCOUNTER — DOCUMENTATION ONLY (OUTPATIENT)
Dept: ANTICOAGULATION | Facility: CLINIC | Age: 62
End: 2022-03-21
Payer: MEDICARE

## 2022-03-21 ENCOUNTER — PATIENT OUTREACH (OUTPATIENT)
Dept: CARE COORDINATION | Facility: CLINIC | Age: 62
End: 2022-03-21
Payer: MEDICARE

## 2022-03-21 NOTE — PROGRESS NOTES
Care Coordination Clinician Chart Review     Situation: Patient chart reviewed by care coordinator.?     Background: Initial assessment and enrollment to Care Coordination was December 2021.?? Patient centered goals were developed with participation from patient.? Lead CC handed patient off to CHW for continued outreach every 30 days.??     Assessment: Per chart review, patient outreach completed by CC CHW on 3/11/22- attempted.? Patient is not actively working to accomplish goal(s).? Patient's goal(s) remain(s) appropriate at this time.? Patient is due for updated Plan of Care.? Annual assessment will be due December 2022.     Goals        1- Medication (pt-stated)       Goal Statement: I will fill my prescriptions    Date Goal set: 12/7/21  Barriers: not taking medications  Strengths: willing to seek assistance   Date to Achieve By: 1 week  Patient expressed understanding of goal: yes    Action steps to achieve this goal:  1. I will contact [place] [facility] [employer] and provide necessary documentation to qualify for the program  2. I will attempt to fill my prescriptions at [facility] for those on their low cost list    ** Did not discuss goal today.         3- Medical (pt-stated)       Goal Statement: I will establish care with cardiology once I have health insurance     Date Goal set: 12/7/21  Barriers: no cardiologist  Strengths: order placed  Date to Achieve By: 3 months  Patient expressed understanding of goal: yes  Action steps to achieve this goal:  1. I will schedule and attend appointment  2. I will attend follow up appointment     ** On Hold until patient has health insurance.    Goal updated: 1/12/22         4- Functional (pt-stated)       Goal Statement: I will be approved for [employer] transportation    Date Goal set: 12/7/21  Barriers: no PCP  Strengths: willingess  Date to Achieve By: 2 mont  Patient expressed understanding of goal: yes    Action steps to achieve this goal:  1. I will ask  PCP on 12/22/21 to complete [employer] form. PCP is working to fill out the PCP portion of the application  2. I will complete and mail my portion of the application. Continuous (MB)    Goal Updated: 1/12/22         5- Psychosocial (pt-stated)       Goal Statement: I will get on wait lists for subsidized housing    Date Goal set: 12/7/21  Barriers: temporary housing  Strengths: daughter  Date to Achieve By: 6 [facility]  Patient expressed understanding of goal: yes    Action steps to achieve this goal:  1. I will contact subsidized housing apartments and provide information needed to get on wait lists. I did not received the Care Plan with the housing resources. My CHW is mailing them out to me again today.    Goal Updated 1/12/22               ??     Plan/Recommendations: The patient will continue working with Care Coordination to achieve above goal(s).? CHW will involve Lead CC as needed or if patient is ready to move to maintenance.? Lead CC will continue to monitor CHW s monthly outreaches and progress to goal(s) every 6 weeks.?     Plan of Care updated and sent to patient: Yes, via [employer]

## 2022-03-21 NOTE — LETTER
[facility]  Patient Centered Plan of Care  About Me:        Patient Name:  Jairo Quevedo    YOB: 1960  Age:         62 year old   Mariia MRN:    [id number] Telephone Information:  Home Phone [phone]   Mobile [phone]       Address:  [address] [place] [address] Email address:  [email or web address]      Emergency Contact(s)    Name Relationship Lgl Grd Work Phone Home Phone Mobile Phone   SOPHIA EDWARDS Daughter   [phone]            Primary language:  English     needed? No   [employer]:  [phone] op. 1  Other communication barriers:Lack of coping    Preferred Method of Communication:     Current living arrangement: I live in a private home with family (Living with daughter-temporary)    Mobility Status/ Medical Equipment: Independent        Health Maintenance  Health Maintenance Reviewed: No data recorded    My Access Plan  Medical Emergency 911   Primary Clinic Line   - [phone]   24 Hour Appointment Line [phone] or  [phone] ([phone]) (toll-free)   24 Hour Nurse Line [phone] (toll-free)   Preferred Urgent Care [facility], [phone]     [facility]  [phone]     Preferred Pharmacy [facility] [address] [place] [address]     Behavioral Health Crisis Line The National Suicide Prevention Lifeline at 1-294.725.3981 or 911             My Care Team Members  Patient Care Team       Relationship Specialty Notifications Start End    Piedad Wade CNP PCP - General Nurse Practitioner - Gerontology Admissions 12/23/21     Phone: [phone] Fax: [phone]         [facility] [address] [place] [address]    Deepali Daniels LSW Lead Care Coordinator  Admissions 12/7/21     Usha Espinosa Community Health Worker  Admissions 12/7/21     [phone]    Rina Byers RN Clinic Care Coordinator Primary Care - CC  Admissions 12/7/21     Phone: [phone]         Piedad Wade CNP Assigned PCP   12/9/21     Phone: [phone] Fax: [phone]         [facility] [place] [address]            My Care Plans  Self Management and Treatment Plan  Goals and (Comments)  Goals        General     1- Medication (pt-stated)      Notes - Note edited  1/12/2022 11:07 AM by Usha Espinosa     Goal Statement: I will fill my prescriptions    Date Goal set: 12/7/21  Barriers: not taking medications  Strengths: willing to seek assistance   Date to Achieve By: 1 week  Patient expressed understanding of goal: yes    Action steps to achieve this goal:  1. I will contact [place] [employer] and provide necessary documentation to qualify for the program  2. I will attempt to fill my prescriptions at [facility] for those on their low cost list    ** Did not discuss goal today.       3- Medical (pt-stated)      Notes - Note edited  1/12/2022 11:06 AM by Usha Espinosa     Goal Statement: I will establish care with cardiology once I have health insurance     Date Goal set: 12/7/21  Barriers: no cardiologist  Strengths: order placed  Date to Achieve By: 3 months  Patient expressed understanding of goal: yes  Action steps to achieve this goal:  1. I will schedule and attend appointment  2. I will attend follow up appointment     ** On Hold until patient has health insurance.    Goal updated: 1/12/22       4- Functional (pt-stated)      Notes - Note edited  1/12/2022 11:06 AM by Usha Espinosa     Goal Statement: I will be approved for [employer] transportation    Date Goal set: 12/7/21  Barriers: no PCP  Strengths: willingess  Date to Achieve By: 2 [facility]  Patient expressed understanding of goal: yes    Action steps to achieve this goal:  1. I will ask PCP on 12/22/21 to complete [employer] form. PCP is working to fill out the PCP portion of the application  2. I will complete and mail my portion of the  application. Continuous (MB)    Goal Updated: 1/12/22       5- Psychosocial (pt-stated)      Notes - Note edited  1/12/2022 11:07 AM by Usha Espinosa     Goal Statement: I will get on wait lists for subsidized housing    Date Goal set: 12/7/21  Barriers: temporary housing  Strengths: daughter  Date to Achieve By: 6 [facility]  Patient expressed understanding of goal: yes    Action steps to achieve this goal:  1. I will contact subsidized housing apartments and provide information needed to get on wait lists. I did not received the Care Plan with the housing resources. My CHW is mailing them out to me again today.    Goal Updated 1/12/22                    Action Plans on File:                       Advance Care Plans/Directives Type:   Other      My Medical and Care Information  Problem List   Patient Active Problem List   Diagnosis     Chronic atrial fibrillation (H)      Current Medications and Allergies:  See printed Medication Report.    Care Coordination Start Date: 12/7/2021   Frequency of Care Coordination: monthly     Form Last Updated: 03/21/2022

## 2022-03-21 NOTE — LETTER
March 21, 2022      Jairo Quevedo  [address] [address] [facility] [address]        You are currently under the care of [facility] for your warfarin (Coumadin ) therapy.  We are contacting you because our records show you were due for an INR on 2/9/22.    There are potentially serious risks when taking warfarin without careful monitoring and we want to make sure you are safely managed.  Routine INR monitoring is required for warfarin refills.     Please call [phone] as soon as possible to schedule an appointment.  If there has been a change in your care or other concerns, please let us know so we can help and or update our records.     Sincerely,       [facility]

## 2022-03-21 NOTE — PROGRESS NOTES
Anticoagulation clinic notificiation    Piedad Liz,    Your patient, Jairo Quevedo,  is past due for an INR check  The patient s last INR was 2.0 on 1/26/22 and was due to come back on 2/9/22.    Jairo Quevedo received phone calls and letters over the last several weeks in attempt to arrange a follow up appointment.  Jairo Quevedo will be sent another letter today.     No action is required from you unless you have additional information or if you would like to reach out personally to Jairo Quevedo.    Please don t hesitate to contact the Anticoagulation Management Program at [phone] if you have any questions or concerns.     Sincerely,     [facility] Anticoagulation Management Program

## 2022-03-28 ENCOUNTER — PATIENT OUTREACH (OUTPATIENT)
Dept: NURSING | Facility: CLINIC | Age: 62
End: 2022-03-28
Payer: MEDICARE

## 2022-03-28 NOTE — PROGRESS NOTES
Clinic Care Coordination Contact    Community Health Worker Follow Up    Care Gaps:     Health Maintenance Due   Topic Date Due     ASTHMA ACTION PLAN  Never done     ASTHMA CONTROL TEST  Never done     ADVANCE CARE PLANNING  Never done     COVID-19 Vaccine (1) Never done     Pneumococcal Vaccine: Pediatrics (0 to 5 Years) and At-Risk Patients (6 to 64 Years) (1 of 2 - PPSV23) Never done     COLORECTAL CANCER SCREENING  Never done     HIV SCREENING  Never done     MEDICARE ANNUAL WELLNESS VISIT  Never done     HEPATITIS C SCREENING  Never done     DTAP/TDAP/TD IMMUNIZATION (1 - Tdap) Never done     LIPID  Never done     ZOSTER IMMUNIZATION (1 of 2) Never done     INFLUENZA VACCINE (1) Never done       Patient accepted scheduling phone number for [facility]  to schedule independently     Goals:   Goals Addressed as of 3/28/2022 at 11:31 AM                    Today    1/12/22       1- Medication (pt-stated)   100%      Added 12/7/21 by Deepali Daniels LSW      I have accomplished looking into RX assistance programs and was denied.     Personal Plan  If anything changes with my income or household size I will look into [place] prescription Assistance again by calling [employer] [employer] at [phone].             3- Medical (pt-stated)   100%  On Hold    Added 12/7/21 by Deepali Daniels LSW      I will will make an establish care appointment with cardiology I hear back from my health insurance     Personal Plan  Once I hear back from my Health Insurance company I will make an appointment with Cardiology.         4- Functional (pt-stated)   100%  40%    Added 12/7/21 by Deepali Daniels LSW      I have accomplished applying for [employer] transportation and was denied.     If in the future I would like to look into [employer] again I will call [phone].         5- Psychosocial (pt-stated)   100%  10%    Added 12/7/21 by Deepali Daniels LSW      I have accomplished getting on wait  lists for subsidized housing.    Personal Plan  I will wait for my name to get to the top of the wait list for subsidized housing.                      CHW Plan: Patient has requested to graduate from [facility] and has no other goals that this patient would like to work on with Clinic Care Coordination. CHW sent request to [facility] RN pool to review for Graduation.    Usha Espinosa  Community Health Worker  [facility] Care Coordination   Office: [phone]

## 2022-03-30 ENCOUNTER — PATIENT OUTREACH (OUTPATIENT)
Dept: CARE COORDINATION | Facility: CLINIC | Age: 62
End: 2022-03-30
Payer: MEDICARE

## 2022-03-30 NOTE — TELEPHONE ENCOUNTER
There is not a whole lot we can do if he is unwilling to follow directions.    Let's get him in for an office visit.

## 2022-03-30 NOTE — TELEPHONE ENCOUNTER
Left patient a detailed message that he should call us back to schedule an appt to be seen with Piedad.

## 2022-03-30 NOTE — PROGRESS NOTES
"Clinic Care Coordination Contact    Community Healthcare Worker spoke with patient on 3/28/22.  Patient asking to be graduated from [facility] as he no longer wanted to work on any goal.  However CCC RN reviewing chart and noted that Cardiology Referral made by Dr. Centeno 12/2/21 and patient has not attended.  INR overdue.  Per anticoagulation Clinic \" The patient s last INR was 2.0 on 1/26/22 and was due to come back on 2/9/22.\"  They have called him and mailed him letters regarding this.  Patient additionally had a PCP Virtual Visit 3/2.  At that visit patient verbalizing '3 day course of increased shortness of breath' it was discussed with patient to call 911.  Patient had agreed with provider.  Ultimately patient did not do this.  Community Healthcare Worker called the patient the following day.  Patient again verbalizing similar symptoms.  Again CHW attempted to get patient to a triage nurse to assist.  Patient declined assistance.    Today [facility] RN called patient to discuss all items from above prior to removing patient from [facility] Program.  Patient stating he was having increased swelling to his feet and ankles.  Unable to determine over the phone how much.  Stated he was having shortness of breath with ambulation.  Patient not short of breath while speaking.  Patient stating he had an upcoming Cardiology Appointment with [facility] 4/16.  He is unable to tell RN what time this appointment is at.  Writer is unable to locate this appointment.  After getting off the phone with Jairo it is noted that 4/16 is on a Saturday.   Writer asking if he had transportation to this appointment.  He stated 'he is working on it'.  Patient stated he does indeed have all of his medications and is taking them on a daily basis.  Writer reminded patient that he is overdue for his INR.  He stated he is unable to get to the clinic for his INR.  Reiterated that 'they could possibly do the INR at his Cardiology appointment'.  " Writer encouraged patient to return to the ED or call 911 should any symptoms worsen AEB increased shortness of breath, increased swelling, increased heart rate, chest pain.  Patient stated an understanding.    CCC will reach out to patient the week of 4/18.  Patient has asked to longer be Enrolled with our program.  However due to patient's ongoing noncompliance will outreach again to inquire on status of Cardiology.

## 2022-04-16 ENCOUNTER — HEALTH MAINTENANCE LETTER (OUTPATIENT)
Age: 62
End: 2022-04-16

## 2022-04-19 ENCOUNTER — TELEPHONE (OUTPATIENT)
Dept: ANTICOAGULATION | Facility: CLINIC | Age: 62
End: 2022-04-19
Payer: MEDICARE

## 2022-04-19 NOTE — TELEPHONE ENCOUNTER
ANTICOAGULATION MANAGEMENT PROGRAM    Piedad Wade,     Our records indicate that Jairo Quevedo remains past due for an INR check. Jairo Quevedo was contacted multiple times over at least the last 8 weeks to attempt to arrange a follow up appointment.    Jairo Quevedo last had an INR checked on 1/26/22 which was 2.0 and was due for follow up on 2/9/22     Called patient,Left message for patient to call and schedule lab appointment as soon as possible. If returning call, please schedule.      At this time Jairo Quevedo will be moved to noncompliant status within the program. While in noncompliant status the patient would continue to be contacted every 6 weeks by the anticoagulation program to attempt to schedule an INR. You will be notified of each contact attempt to make you aware of patient's ongoing noncompliance.       Thank you,     [facility] Anticoagulation Management Program

## 2022-04-22 ENCOUNTER — PATIENT OUTREACH (OUTPATIENT)
Dept: CARE COORDINATION | Facility: CLINIC | Age: 62
End: 2022-04-22
Payer: MEDICARE

## 2022-04-22 NOTE — PROGRESS NOTES
Clinic Care Coordination Contact  [facility]/Voicemail    Clinical Data: Care Coordinator Outreach  Outreach attempted x 1.  Left message on patient's voicemail with call back information and requested return call.  Plan:  Care Coordinator will try to reach patient again in 10 business days.    Next CHW outreach date: 5/6/22    [facility] Worker  [facility] Care Coordination   Office: [phone]

## 2022-05-03 ENCOUNTER — PATIENT OUTREACH (OUTPATIENT)
Dept: CARE COORDINATION | Facility: CLINIC | Age: 62
End: 2022-05-03
Payer: MEDICARE

## 2022-05-03 NOTE — PROGRESS NOTES
Clinic Care Coordination Contact    Situation: Patient chart reviewed by care coordinator.    Background: Patient Enrolled to [facility] 12/8/21.  See Patient Outreach 3/30/22 regarding non-compliance    Assessment: Upon completing a chart review; it appears patient has changed his address to [place].  There is also a Doctors note in Epic from [place].    Plan/Recommendations: Removing self from the Care Team and un-enrolling from CCC.    Please send a new CCC Referral if patient's needs were to change.

## 2022-05-04 ENCOUNTER — DOCUMENTATION ONLY (OUTPATIENT)
Dept: ANTICOAGULATION | Facility: CLINIC | Age: 62
End: 2022-05-04
Payer: MEDICARE

## 2022-05-04 DIAGNOSIS — I48.20 CHRONIC ATRIAL FIBRILLATION (H): Primary | ICD-10-CM

## 2022-05-04 NOTE — PROGRESS NOTES
ANTICOAGULATION  MANAGEMENT    Jairo Quevedo is being discharged from the [facility] Anticoagulation Management Program ([facility]).    Reason for discharge: care has been transferred to [place]    Anticoagulation episode resolved, ACC referral closed and INR Standing order discontinued    If patient needs warfarin management in the future, please send a new referral    Roseline Weiss RN

## 2022-10-22 ENCOUNTER — HEALTH MAINTENANCE LETTER (OUTPATIENT)
Age: 62
End: 2022-10-22

## 2023-06-01 ENCOUNTER — HEALTH MAINTENANCE LETTER (OUTPATIENT)
Age: 63
End: 2023-06-01

## 2024-06-02 ENCOUNTER — HEALTH MAINTENANCE LETTER (OUTPATIENT)
Age: 64
End: 2024-06-02